# Patient Record
Sex: FEMALE | HISPANIC OR LATINO | Employment: FULL TIME | URBAN - METROPOLITAN AREA
[De-identification: names, ages, dates, MRNs, and addresses within clinical notes are randomized per-mention and may not be internally consistent; named-entity substitution may affect disease eponyms.]

---

## 2017-06-01 ENCOUNTER — HOSPITAL ENCOUNTER (EMERGENCY)
Facility: MEDICAL CENTER | Age: 57
End: 2017-06-01
Payer: COMMERCIAL

## 2017-06-01 VITALS
WEIGHT: 180.34 LBS | RESPIRATION RATE: 16 BRPM | DIASTOLIC BLOOD PRESSURE: 99 MMHG | HEIGHT: 64 IN | HEART RATE: 77 BPM | TEMPERATURE: 98.8 F | BODY MASS INDEX: 30.79 KG/M2 | OXYGEN SATURATION: 95 % | SYSTOLIC BLOOD PRESSURE: 193 MMHG

## 2017-06-01 LAB — EKG IMPRESSION: NORMAL

## 2017-06-01 PROCEDURE — 302449 STATCHG TRIAGE ONLY (STATISTIC)

## 2017-06-01 PROCEDURE — 93005 ELECTROCARDIOGRAM TRACING: CPT

## 2017-06-01 ASSESSMENT — PAIN SCALES - GENERAL: PAINLEVEL_OUTOF10: 0

## 2017-06-01 NOTE — ED NOTES
"Chief Complaint   Patient presents with   • Dizziness     started feeling dizzy after receiving \"numbing medication for dental procedure\"   • Blood Pressure Problem     told high blood pressure after dental procedure     /99 mmHg  Pulse 77  Temp(Src) 37.1 °C (98.8 °F)  Resp 16  Ht 1.626 m (5' 4.02\")  Wt 81.8 kg (180 lb 5.4 oz)  BMI 30.94 kg/m2  SpO2 95%  LMP 03/09/2011    "

## 2017-06-01 NOTE — ED NOTES
Patient reports to triage RN that she will be leaving. Triage RN rechecked BP prior to patient leaving 175/90. Patient encouraged to returned to ED for chest pain, SOB, blurred vision, or any other concerning, pt verbalized understanding.

## 2017-06-01 NOTE — ED NOTES
Per note from family member from dental office pt received Lido w/ Epi and Carbocaine during procedure

## 2019-11-15 ENCOUNTER — OFFICE VISIT (OUTPATIENT)
Dept: URGENT CARE | Facility: PHYSICIAN GROUP | Age: 59
End: 2019-11-15

## 2019-11-15 VITALS
DIASTOLIC BLOOD PRESSURE: 100 MMHG | BODY MASS INDEX: 30.73 KG/M2 | HEIGHT: 64 IN | RESPIRATION RATE: 18 BRPM | WEIGHT: 180 LBS | SYSTOLIC BLOOD PRESSURE: 164 MMHG | TEMPERATURE: 97 F | OXYGEN SATURATION: 96 % | HEART RATE: 97 BPM

## 2019-11-15 DIAGNOSIS — R03.0 ELEVATED BLOOD PRESSURE READING: ICD-10-CM

## 2019-11-15 DIAGNOSIS — R30.0 DYSURIA: ICD-10-CM

## 2019-11-15 LAB
APPEARANCE UR: NORMAL
BILIRUB UR STRIP-MCNC: NORMAL MG/DL
COLOR UR AUTO: NORMAL
GLUCOSE UR STRIP.AUTO-MCNC: NORMAL MG/DL
KETONES UR STRIP.AUTO-MCNC: NORMAL MG/DL
LEUKOCYTE ESTERASE UR QL STRIP.AUTO: NORMAL
NITRITE UR QL STRIP.AUTO: NORMAL
PH UR STRIP.AUTO: 7 [PH] (ref 5–8)
PROT UR QL STRIP: NORMAL MG/DL
RBC UR QL AUTO: NORMAL
SP GR UR STRIP.AUTO: 1.02
UROBILINOGEN UR STRIP-MCNC: 0.2 MG/DL

## 2019-11-15 PROCEDURE — 81002 URINALYSIS NONAUTO W/O SCOPE: CPT | Performed by: FAMILY MEDICINE

## 2019-11-15 PROCEDURE — 99203 OFFICE O/P NEW LOW 30 MIN: CPT | Performed by: FAMILY MEDICINE

## 2019-11-15 RX ORDER — PHENAZOPYRIDINE HYDROCHLORIDE 200 MG/1
200 TABLET, FILM COATED ORAL 3 TIMES DAILY PRN
Qty: 6 TAB | Refills: 0 | Status: SHIPPED | OUTPATIENT
Start: 2019-11-15 | End: 2019-12-27

## 2019-11-15 RX ORDER — NITROFURANTOIN 25; 75 MG/1; MG/1
100 CAPSULE ORAL 2 TIMES DAILY
Qty: 10 CAP | Refills: 0 | Status: SHIPPED | OUTPATIENT
Start: 2019-11-15 | End: 2019-11-20

## 2019-11-15 NOTE — PROGRESS NOTES
"Subjective:      Latisha Maciel is a 59 y.o. female who presents with UTI (pain, x5d)            5d urinary urgency and pelvic pressure.  No pain with urination.  No fever or flank pain.  No hematuria.  Migraine this morning/notes this elevates her BP.  Symptoms are moderate severity and progressively worse.  No OTC medications.  No other aggravating or alleviating factors.      Review of Systems   Constitutional: Negative for malaise/fatigue and weight loss.   Eyes: Negative for discharge and redness.   Gastrointestinal: Negative for nausea and vomiting.   Musculoskeletal: Negative for joint pain and myalgias.   Skin: Negative for itching and rash.     .  Medications, Allergies, and current problem list reviewed today in Epic       Objective:     BP (!) 164/100 (BP Location: Left arm, Patient Position: Sitting, BP Cuff Size: Adult)   Pulse 97   Temp 36.1 °C (97 °F) (Temporal)   Resp 18   Ht 1.626 m (5' 4\")   Wt 81.6 kg (180 lb)   LMP 03/09/2011   SpO2 96%   BMI 30.90 kg/m²      Physical Exam  Constitutional:       General: She is not in acute distress.     Appearance: She is well-developed.   HENT:      Head: Normocephalic and atraumatic.   Eyes:      Extraocular Movements: Extraocular movements intact.      Conjunctiva/sclera: Conjunctivae normal.      Pupils: Pupils are equal, round, and reactive to light.   Cardiovascular:      Rate and Rhythm: Normal rate and regular rhythm.      Heart sounds: Normal heart sounds. No murmur.   Pulmonary:      Effort: Pulmonary effort is normal.      Breath sounds: Normal breath sounds. No wheezing.   Genitourinary:     Comments: Suprapubic tenderness.  No CVAT.  Skin:     General: Skin is warm and dry.      Findings: No rash.   Neurological:      Mental Status: She is alert and oriented to person, place, and time.                 Assessment/Plan:   UA reviewed    1. Elevated blood pressure reading     2. Dysuria  nitrofurantoin monohyd macro (MACROBID) 100 MG Cap    " phenazopyridine (PYRIDIUM) 200 MG Tab    POCT Urinalysis     Differential diagnosis, natural history, supportive care, and indications for immediate follow-up discussed at length.     Patient has already been using antibiotics.  Discussed that urine culture is unlikely to be helpful in this setting.  Recommended to complete 5-day course of nitrofurantoin and follow-up if symptoms persist.    BP log and f/u with pcp.

## 2019-11-28 ASSESSMENT — ENCOUNTER SYMPTOMS
VOMITING: 0
NAUSEA: 0
WEIGHT LOSS: 0
EYE REDNESS: 0
MYALGIAS: 0
EYE DISCHARGE: 0

## 2019-12-27 ENCOUNTER — HOSPITAL ENCOUNTER (EMERGENCY)
Facility: MEDICAL CENTER | Age: 59
End: 2019-12-27
Attending: EMERGENCY MEDICINE

## 2019-12-27 ENCOUNTER — APPOINTMENT (OUTPATIENT)
Dept: RADIOLOGY | Facility: MEDICAL CENTER | Age: 59
End: 2019-12-27
Attending: EMERGENCY MEDICINE

## 2019-12-27 VITALS
HEART RATE: 91 BPM | DIASTOLIC BLOOD PRESSURE: 77 MMHG | RESPIRATION RATE: 20 BRPM | SYSTOLIC BLOOD PRESSURE: 149 MMHG | BODY MASS INDEX: 32.37 KG/M2 | TEMPERATURE: 98.8 F | OXYGEN SATURATION: 94 % | HEIGHT: 64 IN | WEIGHT: 189.6 LBS

## 2019-12-27 DIAGNOSIS — R10.84 GENERALIZED ABDOMINAL PAIN: ICD-10-CM

## 2019-12-27 LAB
ALBUMIN SERPL BCP-MCNC: 4 G/DL (ref 3.2–4.9)
ALBUMIN/GLOB SERPL: 1 G/DL
ALP SERPL-CCNC: 168 U/L (ref 30–99)
ALT SERPL-CCNC: 22 U/L (ref 2–50)
ANION GAP SERPL CALC-SCNC: 14 MMOL/L (ref 0–11.9)
APPEARANCE UR: CLEAR
APTT PPP: 28.9 SEC (ref 24.7–36)
AST SERPL-CCNC: 32 U/L (ref 12–45)
BASOPHILS # BLD AUTO: 0.6 % (ref 0–1.8)
BASOPHILS # BLD: 0.08 K/UL (ref 0–0.12)
BILIRUB SERPL-MCNC: 0.7 MG/DL (ref 0.1–1.5)
BILIRUB UR QL STRIP.AUTO: NEGATIVE
BUN SERPL-MCNC: 10 MG/DL (ref 8–22)
CALCIUM SERPL-MCNC: 8.9 MG/DL (ref 8.4–10.2)
CHLORIDE SERPL-SCNC: 104 MMOL/L (ref 96–112)
CO2 SERPL-SCNC: 21 MMOL/L (ref 20–33)
COLOR UR: YELLOW
CREAT SERPL-MCNC: 0.69 MG/DL (ref 0.5–1.4)
EOSINOPHIL # BLD AUTO: 0.18 K/UL (ref 0–0.51)
EOSINOPHIL NFR BLD: 1.3 % (ref 0–6.9)
ERYTHROCYTE [DISTWIDTH] IN BLOOD BY AUTOMATED COUNT: 36.9 FL (ref 35.9–50)
GLOBULIN SER CALC-MCNC: 4 G/DL (ref 1.9–3.5)
GLUCOSE SERPL-MCNC: 135 MG/DL (ref 65–99)
GLUCOSE UR STRIP.AUTO-MCNC: NEGATIVE MG/DL
HCT VFR BLD AUTO: 45.9 % (ref 37–47)
HGB BLD-MCNC: 16 G/DL (ref 12–16)
IMM GRANULOCYTES # BLD AUTO: 0.07 K/UL (ref 0–0.11)
IMM GRANULOCYTES NFR BLD AUTO: 0.5 % (ref 0–0.9)
INR PPP: 0.94 (ref 0.87–1.13)
KETONES UR STRIP.AUTO-MCNC: NEGATIVE MG/DL
LEUKOCYTE ESTERASE UR QL STRIP.AUTO: NEGATIVE
LYMPHOCYTES # BLD AUTO: 1.39 K/UL (ref 1–4.8)
LYMPHOCYTES NFR BLD: 10.4 % (ref 22–41)
MCH RBC QN AUTO: 30.8 PG (ref 27–33)
MCHC RBC AUTO-ENTMCNC: 34.9 G/DL (ref 33.6–35)
MCV RBC AUTO: 88.3 FL (ref 81.4–97.8)
MICRO URNS: NORMAL
MONOCYTES # BLD AUTO: 0.76 K/UL (ref 0–0.85)
MONOCYTES NFR BLD AUTO: 5.7 % (ref 0–13.4)
NEUTROPHILS # BLD AUTO: 10.87 K/UL (ref 2–7.15)
NEUTROPHILS NFR BLD: 81.5 % (ref 44–72)
NITRITE UR QL STRIP.AUTO: NEGATIVE
NRBC # BLD AUTO: 0 K/UL
NRBC BLD-RTO: 0 /100 WBC
PH UR STRIP.AUTO: 7 [PH] (ref 5–8)
PLATELET # BLD AUTO: 65 K/UL (ref 164–446)
PMV BLD AUTO: 11.9 FL (ref 9–12.9)
POTASSIUM SERPL-SCNC: 3.6 MMOL/L (ref 3.6–5.5)
PROT SERPL-MCNC: 8 G/DL (ref 6–8.2)
PROT UR QL STRIP: NEGATIVE MG/DL
PROTHROMBIN TIME: 12.7 SEC (ref 12–14.6)
RBC # BLD AUTO: 5.2 M/UL (ref 4.2–5.4)
RBC UR QL AUTO: NEGATIVE
SODIUM SERPL-SCNC: 139 MMOL/L (ref 135–145)
SP GR UR STRIP.AUTO: 1.01
WBC # BLD AUTO: 13.4 K/UL (ref 4.8–10.8)

## 2019-12-27 PROCEDURE — 700102 HCHG RX REV CODE 250 W/ 637 OVERRIDE(OP): Performed by: EMERGENCY MEDICINE

## 2019-12-27 PROCEDURE — 700105 HCHG RX REV CODE 258: Performed by: EMERGENCY MEDICINE

## 2019-12-27 PROCEDURE — 85025 COMPLETE CBC W/AUTO DIFF WBC: CPT

## 2019-12-27 PROCEDURE — 85610 PROTHROMBIN TIME: CPT

## 2019-12-27 PROCEDURE — 36415 COLL VENOUS BLD VENIPUNCTURE: CPT

## 2019-12-27 PROCEDURE — 99284 EMERGENCY DEPT VISIT MOD MDM: CPT

## 2019-12-27 PROCEDURE — 94760 N-INVAS EAR/PLS OXIMETRY 1: CPT

## 2019-12-27 PROCEDURE — 85730 THROMBOPLASTIN TIME PARTIAL: CPT

## 2019-12-27 PROCEDURE — A9270 NON-COVERED ITEM OR SERVICE: HCPCS | Performed by: EMERGENCY MEDICINE

## 2019-12-27 PROCEDURE — 74176 CT ABD & PELVIS W/O CONTRAST: CPT

## 2019-12-27 PROCEDURE — 81003 URINALYSIS AUTO W/O SCOPE: CPT

## 2019-12-27 PROCEDURE — 80053 COMPREHEN METABOLIC PANEL: CPT

## 2019-12-27 RX ORDER — CODEINE/BUTALBITAL/ASA/CAFFEIN 30-50-325
1 CAPSULE ORAL EVERY 6 HOURS PRN
Status: SHIPPED | COMMUNITY
End: 2020-11-23

## 2019-12-27 RX ORDER — ONDANSETRON 2 MG/ML
4 INJECTION INTRAMUSCULAR; INTRAVENOUS ONCE
Status: DISCONTINUED | OUTPATIENT
Start: 2019-12-27 | End: 2019-12-27 | Stop reason: HOSPADM

## 2019-12-27 RX ORDER — HYDROMORPHONE HYDROCHLORIDE 1 MG/ML
0.5 INJECTION, SOLUTION INTRAMUSCULAR; INTRAVENOUS; SUBCUTANEOUS ONCE
Status: DISCONTINUED | OUTPATIENT
Start: 2019-12-27 | End: 2019-12-27 | Stop reason: HOSPADM

## 2019-12-27 RX ORDER — CEFDINIR 300 MG/1
300 CAPSULE ORAL 2 TIMES DAILY
Qty: 20 CAP | Refills: 0 | Status: SHIPPED | OUTPATIENT
Start: 2019-12-27 | End: 2019-12-27 | Stop reason: SDUPTHER

## 2019-12-27 RX ORDER — CEFDINIR 300 MG/1
300 CAPSULE ORAL 2 TIMES DAILY
Qty: 20 CAP | Refills: 0 | Status: SHIPPED | OUTPATIENT
Start: 2019-12-27 | End: 2020-11-23

## 2019-12-27 RX ORDER — METRONIDAZOLE 500 MG/1
500 TABLET ORAL 3 TIMES DAILY
Qty: 30 TAB | Refills: 0 | Status: SHIPPED | OUTPATIENT
Start: 2019-12-27 | End: 2020-01-06

## 2019-12-27 RX ORDER — SODIUM CHLORIDE, SODIUM LACTATE, POTASSIUM CHLORIDE, CALCIUM CHLORIDE 600; 310; 30; 20 MG/100ML; MG/100ML; MG/100ML; MG/100ML
1000 INJECTION, SOLUTION INTRAVENOUS ONCE
Status: COMPLETED | OUTPATIENT
Start: 2019-12-27 | End: 2019-12-27

## 2019-12-27 RX ORDER — METRONIDAZOLE 500 MG/1
500 TABLET ORAL ONCE
Status: COMPLETED | OUTPATIENT
Start: 2019-12-27 | End: 2019-12-27

## 2019-12-27 RX ORDER — CEFDINIR 300 MG/1
300 CAPSULE ORAL ONCE
Status: COMPLETED | OUTPATIENT
Start: 2019-12-27 | End: 2019-12-27

## 2019-12-27 RX ADMIN — METRONIDAZOLE 500 MG: 500 TABLET ORAL at 18:06

## 2019-12-27 RX ADMIN — SODIUM CHLORIDE, SODIUM LACTATE, POTASSIUM CHLORIDE, AND CALCIUM CHLORIDE 1000 ML: 600; 310; 30; 20 INJECTION, SOLUTION INTRAVENOUS at 18:15

## 2019-12-27 RX ADMIN — CEFDINIR 300 MG: 300 CAPSULE ORAL at 18:06

## 2019-12-27 ASSESSMENT — PAIN DESCRIPTION - DESCRIPTORS: DESCRIPTORS: ACHING;SHARP

## 2019-12-27 NOTE — ED PROVIDER NOTES
ED Provider Note    ED Provider Note      Primary care provider: Theron Reno M.D.    CHIEF COMPLAINT  Chief Complaint   Patient presents with   • Abdominal Pain       HPI  Chaparrita Maciel is a 59 y.o. female who presents to the Emergency Department with chief complaint of abdominal pain nausea vomiting diarrhea.  Patient's had symptoms for last couple days gotten progressively worse throughout the day today she describes her pain is crampy throughout her entire abdomen at a 9 out of 10.  Multiple episodes of diarrhea no blood in her diarrhea no dark tarry stool.  She has been extremely nauseated without emesis minor chills without measured fever no headache altered mental status cough congestion chest pain or shortness of breath no urinary complaints no abnormal vaginal bleeding or discharge.    REVIEW OF SYSTEMS  10 systems reviewed and otherwise negative, pertinent positives and negatives listed in the history of present illness.    PAST MEDICAL HISTORY   has a past medical history of Hypertension, Migraine, Mitral valve prolapse, Palpitations, Sarcoid (HCC), and Sarcoidosis, lung (HCC).    SURGICAL HISTORY  patient denies any surgical history    SOCIAL HISTORY  Social History     Tobacco Use   • Smoking status: Never Smoker   • Smokeless tobacco: Never Used   Substance Use Topics   • Alcohol use: No   • Drug use: No      Social History     Substance and Sexual Activity   Drug Use No       FAMILY HISTORY  Non-Contributory    CURRENT MEDICATIONS  Home Medications     Reviewed by Kieran Erickson (Pharmacy Tech) on 12/27/19 at 1447  Med List Status: Complete   Medication Last Dose Status   APPLE CIDER VINEGAR PO 12/26/2019 Active   aspirin (ASA) 81 MG Chew Tab chewable tablet 12/26/2019 Active   butalbital-aspirin-caffeine-codeine (FIORINAL/CODEINE #3) -46-30 MG per capsule 12/26/2019 Active                ALLERGIES  Allergies   Allergen Reactions   • Iodine    • Morphine Anaphylaxis and Vomiting  "  • Percocet [Perloxx]    • Sulfa Drugs      Sick   • Tetracycline      Sick     • Vicodin [Hydrocodone-Acetaminophen]        PHYSICAL EXAM  VITAL SIGNS: BP (!) 216/131   Pulse (!) 122   Temp 38 °C (100.4 °F) (Temporal)   Resp 20   Ht 1.626 m (5' 4\")   Wt 86 kg (189 lb 9.5 oz)   LMP 03/09/2011   SpO2 95%   BMI 32.54 kg/m²   Pulse ox interpretation: I interpret this pulse ox as normal.  Constitutional: Alert and oriented x 3, minimal distress  HEENT: Atraumatic normocephalic, pupils are equal round reactive to light extraocular movements are intact. The nares is clear, external ears are normal, mouth shows moist mucous membranes  Neck: Supple, no JVD no tracheal deviation  Cardiovascular: Tachycardic no murmur rub or gallop 2+ pulses peripherally x4  Thorax & Lungs: No respiratory distress, no wheezes rales or rhonchi, No chest tenderness.   GI: Diffusely tender without localization no rebound no guarding positive bowel sounds nondistended  Skin: Warm dry no acute rash or lesion  Musculoskeletal: Moving all extremities with full range and 5 of 5 strength, no acute  deformity  Neurologic: Cranial nerves III through XII are grossly intact, no sensory deficit, no cerebellar dysfunction   Psychiatric: Appropriate affect for situation at this time      DIAGNOSTIC STUDIES / PROCEDURES  LABS      Results for orders placed or performed during the hospital encounter of 12/27/19   URINALYSIS,CULTURE IF INDICATED   Result Value Ref Range    Color Yellow     Character Clear     Specific Gravity 1.010 <1.035    Ph 7.0 5.0 - 8.0    Glucose Negative Negative mg/dL    Ketones Negative Negative mg/dL    Protein Negative Negative mg/dL    Bilirubin Negative Negative    Nitrite Negative Negative    Leukocyte Esterase Negative Negative    Occult Blood Negative Negative    Micro Urine Req see below    CBC WITH DIFFERENTIAL   Result Value Ref Range    WBC 13.4 (H) 4.8 - 10.8 K/uL    RBC 5.20 4.20 - 5.40 M/uL    Hemoglobin 16.0 " 12.0 - 16.0 g/dL    Hematocrit 45.9 37.0 - 47.0 %    MCV 88.3 81.4 - 97.8 fL    MCH 30.8 27.0 - 33.0 pg    MCHC 34.9 33.6 - 35.0 g/dL    RDW 36.9 35.9 - 50.0 fL    Platelet Count 65 (L) 164 - 446 K/uL    MPV 11.9 9.0 - 12.9 fL    Neutrophils-Polys 81.50 (H) 44.00 - 72.00 %    Lymphocytes 10.40 (L) 22.00 - 41.00 %    Monocytes 5.70 0.00 - 13.40 %    Eosinophils 1.30 0.00 - 6.90 %    Basophils 0.60 0.00 - 1.80 %    Immature Granulocytes 0.50 0.00 - 0.90 %    Nucleated RBC 0.00 /100 WBC    Neutrophils (Absolute) 10.87 (H) 2.00 - 7.15 K/uL    Lymphs (Absolute) 1.39 1.00 - 4.80 K/uL    Monos (Absolute) 0.76 0.00 - 0.85 K/uL    Eos (Absolute) 0.18 0.00 - 0.51 K/uL    Baso (Absolute) 0.08 0.00 - 0.12 K/uL    Immature Granulocytes (abs) 0.07 0.00 - 0.11 K/uL    NRBC (Absolute) 0.00 K/uL   Comp Metabolic Panel   Result Value Ref Range    Sodium 139 135 - 145 mmol/L    Potassium 3.6 3.6 - 5.5 mmol/L    Chloride 104 96 - 112 mmol/L    Co2 21 20 - 33 mmol/L    Anion Gap 14.0 (H) 0.0 - 11.9    Glucose 135 (H) 65 - 99 mg/dL    Bun 10 8 - 22 mg/dL    Creatinine 0.69 0.50 - 1.40 mg/dL    Calcium 8.9 8.4 - 10.2 mg/dL    AST(SGOT) 32 12 - 45 U/L    ALT(SGPT) 22 2 - 50 U/L    Alkaline Phosphatase 168 (H) 30 - 99 U/L    Total Bilirubin 0.7 0.1 - 1.5 mg/dL    Albumin 4.0 3.2 - 4.9 g/dL    Total Protein 8.0 6.0 - 8.2 g/dL    Globulin 4.0 (H) 1.9 - 3.5 g/dL    A-G Ratio 1.0 g/dL   APTT   Result Value Ref Range    APTT 28.9 24.7 - 36.0 sec   PROTHROMBIN TIME (INR)   Result Value Ref Range    PT 12.7 12.0 - 14.6 sec    INR 0.94 0.87 - 1.13   ESTIMATED GFR   Result Value Ref Range    GFR If African American >60 >60 mL/min/1.73 m 2    GFR If Non African American >60 >60 mL/min/1.73 m 2       All labs reviewed by me.      RADIOLOGY  CT-ABDOMEN-PELVIS W/O   Final Result      1.  Extensive diverticulosis. Inflamed diverticulum in the proximal sigmoid colon with adjacent stranding in the pericolonic fat is consistent with acute diverticulitis. No  "abscess or pneumoperitoneum is identified.      2.  Hepatic steatosis.        The radiologist's interpretation of all radiological studies have been reviewed by me.    COURSE & MEDICAL DECISION MAKING  Pertinent Labs & Imaging studies reviewed. (See chart for details)    3:06 PM - Patient seen and examined at bedside.         Patient noted to have slightly elevated blood pressure likely circumstantial secondary to presenting complaint. Referred to primary care physician for further evaluation.     Patient was given IV fluids based on tachycardia dry mucous membranes concern for dehydration, oral hydration was not attempted due to insufficiency for hydration, after fluids had resolution of tachycardia and improvement of symptoms    Medical Decision Making: CT scan shows extensive diverticulosis throughout the colon some inflamed diverticulum in the proximal sigmoid with some adjacent stranding consistent with acute diverticulitis no abscess or perforation patient's tachycardia is complete resolved minimal leukocytosis she was given dose of Omnicef and metronidazole here she will be prescribed the same.  We discussed outpatient variant versus inpatient management patient prefers to try outpatient which I think is appropriate.  Given prescription for above instructed return for worsening pain any blood in stool blood in emesis any other acute symptoms or concerns otherwise follow-up with primary care.    /77   Pulse 91   Temp 37.1 °C (98.8 °F) (Temporal)   Resp 20   Ht 1.626 m (5' 4\")   Wt 86 kg (189 lb 9.5 oz)   LMP 03/09/2011   SpO2 94%   BMI 32.54 kg/m²     Theron Reno M.D.  [address] [facility] [place] [place] [address]  [phone]    In 2 days      [facility], Emergency Dept  [address]  [facility] [id number] [phone]    in 12-24 hours if symptoms persist,, immediately if symptoms worsen          FINAL IMPRESSION  1. Generalized abdominal pain          This " dictation has been created using voice recognition software and/or scribes. The accuracy of the dictation is limited by the abilities of the software and the expertise of the scribes. I expect there may be some errors of grammar and possibly content. I made every attempt to manually correct the errors within my dictation. However, errors related to voice recognition software and/or scribes may still exist and should be interpreted within the appropriate context.

## 2019-12-27 NOTE — ED NOTES
Pt complains of RLQ pain with no fevers. Pt states she has a HA. md aware of HA and HTN. Pt is able to talk in full coherent sentences

## 2019-12-27 NOTE — ED TRIAGE NOTES
Pt c/o sudden onset abd pain yesterday. Pt notes pain is around belly button area, with radiation into RLQ. Pt c/o n/v/d. Pt denies injury. Pt denies urinary symptoms.     UA collected in triage. Sent to lab for processing.

## 2019-12-28 NOTE — ED NOTES
Assisted w/ pt care.  Pt declined Dilaudid and Zofran at this time.  Pt and family member aware waiting for test results and chart review by ERP.

## 2020-11-05 ENCOUNTER — TELEPHONE (OUTPATIENT)
Dept: SCHEDULING | Facility: IMAGING CENTER | Age: 60
End: 2020-11-05

## 2020-11-23 ENCOUNTER — TELEMEDICINE (OUTPATIENT)
Dept: MEDICAL GROUP | Age: 60
End: 2020-11-23
Payer: COMMERCIAL

## 2020-11-23 VITALS — HEIGHT: 64 IN | WEIGHT: 185 LBS | TEMPERATURE: 97.5 F | BODY MASS INDEX: 31.58 KG/M2

## 2020-11-23 DIAGNOSIS — J45.20 INTERMITTENT ASTHMA WITHOUT COMPLICATION, UNSPECIFIED ASTHMA SEVERITY: ICD-10-CM

## 2020-11-23 DIAGNOSIS — E55.9 HYPOVITAMINOSIS D: ICD-10-CM

## 2020-11-23 DIAGNOSIS — I10 ESSENTIAL HYPERTENSION: ICD-10-CM

## 2020-11-23 DIAGNOSIS — Z00.00 HEALTH CARE MAINTENANCE: ICD-10-CM

## 2020-11-23 DIAGNOSIS — R53.83 FATIGUE, UNSPECIFIED TYPE: ICD-10-CM

## 2020-11-23 DIAGNOSIS — E78.5 DYSLIPIDEMIA: ICD-10-CM

## 2020-11-23 DIAGNOSIS — D69.6 THROMBOCYTOPENIA (HCC): ICD-10-CM

## 2020-11-23 DIAGNOSIS — Z12.11 SCREENING FOR MALIGNANT NEOPLASM OF COLON: ICD-10-CM

## 2020-11-23 DIAGNOSIS — Z00.00 ANNUAL PHYSICAL EXAM: ICD-10-CM

## 2020-11-23 DIAGNOSIS — D86.0 SARCOIDOSIS OF LUNG (HCC): ICD-10-CM

## 2020-11-23 DIAGNOSIS — Z12.31 ENCOUNTER FOR SCREENING MAMMOGRAM FOR BREAST CANCER: ICD-10-CM

## 2020-11-23 DIAGNOSIS — Z11.59 NEED FOR HEPATITIS C SCREENING TEST: ICD-10-CM

## 2020-11-23 PROCEDURE — 99214 OFFICE O/P EST MOD 30 MIN: CPT | Mod: 25 | Performed by: INTERNAL MEDICINE

## 2020-11-23 PROCEDURE — 99396 PREV VISIT EST AGE 40-64: CPT | Performed by: INTERNAL MEDICINE

## 2020-11-23 RX ORDER — LOSARTAN POTASSIUM 25 MG/1
25 TABLET ORAL DAILY
Qty: 30 TAB | Refills: 0 | Status: SHIPPED | OUTPATIENT
Start: 2020-11-23 | End: 2021-04-08

## 2020-11-23 RX ORDER — BUTALBITAL, ACETAMINOPHEN, CAFFEINE AND CODEINE PHOSPHATE 50; 325; 40; 30 MG/1; MG/1; MG/1; MG/1
CAPSULE ORAL
COMMUNITY
Start: 2020-09-18

## 2020-11-23 ASSESSMENT — PAIN SCALES - GENERAL: PAINLEVEL: NO PAIN

## 2020-11-23 ASSESSMENT — FIBROSIS 4 INDEX: FIB4 SCORE: 6.4

## 2020-11-23 NOTE — PROGRESS NOTES
Telemedicine Visit: Established Patient     This Remote Face to Face encounter was conducted via Zoom. Given the importance of social distancing and other strategies recommended to reduce the risk of COVID-19 transmission, I am providing medical care to this patient via audio/video visit in place of an in person visit at the request of the patient. Verbal consent to telehealth, risks, benefits, and consequences were discussed. Patient retains the right to withdraw at any time. All existing confidentiality protections apply. The patient has access to all transmitted medical information. No dissemination of any patient images or information to other entities without further written consent.    CHIEF COMPLAINT     Chief Complaint   Patient presents with   • New Patient   Hypertension    HPI  Chaparrita Maciel is a 60 y.o. female who presents today for the following     HCM  Recommendations:  Regular exercise at least 4 days a week  Diet: advised balanced diet  Dental exam at least 1-2 times per year  Sunscreen use: advised     Immunizations:  TdaP:  advised  Shingles: advised  Influenza: advised     Colonoscopy: ordered     GYN  Previous PAP:   Due, normal   Abnormal PAP: no  Last Mammography: ordered     Hypertension  Meds: Losartan 25 mg daily, taking as prescribed.   Denies:  -  headaches, vision problems, tinnitus.                 -  chest pain/pressure, palpitations, irregular heart beats, exertional, dyspnea, peripheral edema.      - medication side effect: unusual fatigue, slow heartbeat, foot/leg swelling, cough.  Low salt diet: advised  Diet: advised cardiac  Exercise: advised daily  BMI: Body mass index is 31.76 kg/m².  FH of HTN: brother, father    Dyslipidemia, hepatic steatosis  The patient has slightly abnormal lipid panel, no medications.  Diet /Exercise/BMI:  As above  FH: brother    CT abdomen, 12/27/2019  Hepatic steatosis     Hypovitaminosis D, fatigue  The patient had low vitamin D level.  C/o  fatigue.  Vitamin D supplement: OTC.    Thrombocytopenia  CBC showed thrombocytopenia.  Patient has not have easy bleeding or bruising.    MIigraine  Onset: 18 y/o  The patient reports  frontal headaches, described as: moderate in severity , squeezing with nausea, photophobia and sonophobia, without radiation.  Usual frequency is twice a week.  Aura is present.  Headaches are relieved by fioricet  Known triggers include:  weather changes.   Current stressors include: stress.  Previous treatment and prevention include: multiple    Pertinent negatives:  Denies difficulty with speech or swallowing, facial numbness or tingling, focal weakness. Denies sore throat or cough, fever, sinus congestion, ear pain, tooth clenching or grinding.    Family Hx: migraine headaches in child  Prior imaging: yes    Sarcoidosis, lungs / asthma  60-year-old, female, history of controlled asthma, without recent albuterol use    Diagnosed with sarcoidosis in 2003   - Found to have lung nodules   - had bx  Was f/u by rheumatology, the last appointment > 2 yrs ago.  No available records.    Denies:  -Fever, chills, cough  -Anorexia, weight loss    CXR, 3/8/2016  1.  No acute cardiopulmonary disease.  2.  Cardiomegaly    Reviewed PMH, PSH, FH, SH, ALL, HCM/IMM, updated  Reviewed MEDS, updated    CURRENT MEDICATIONS  Current Outpatient Medications   Medication Sig Dispense Refill   • butalbital-acetaminophen-caffeine-codeine (FIORICET W/CODEINE) -81-30 MG per capsule      • multivitamin (THERAGRAN) Tab Take 1 Tab by mouth every day.     • VITAMIN D PO Take 1,000 mg by mouth 4 times a day.     • aspirin (ASA) 81 MG Chew Tab chewable tablet Take 81 mg by mouth every day.       No current facility-administered medications for this visit.      ALLERGIES  Allergies: Epinephrine, Iodine, Morphine, Percocet [perloxx], Shellfish allergy, Sulfa drugs, Tetracycline, and Vicodin [hydrocodone-acetaminophen]  PAST MEDICAL HISTORY  Past Medical  "History:   Diagnosis Date   • Hypertension    • Migraine    • Mitral valve prolapse    • Palpitations    • Sarcoid    • Sarcoidosis, lung (HCC)      SURGICAL HISTORY  She  has no past surgical history on file.  SOCIAL HISTORY  Social History     Tobacco Use   • Smoking status: Never Smoker   • Smokeless tobacco: Never Used   Substance Use Topics   • Alcohol use: No   • Drug use: No     Social History     Social History Narrative   • Not on file     FAMILY HISTORY  No family history on file.  No family status information on file.     ROS   Constitutional: Negative for fever, chills, fatigue.  HENT: Negative for congestion, sore throat.  Eyes: Negative for vision problems.   Respiratory: Negative for cough, shortness of breath.  Cardiovascular: Negative for chest pain, palpitations.   Gastrointestinal: Negative for heartburn, nausea, abdominal pain.   Genitourinary: Negative for dysuria.  Musculoskeletal: Negative for significant myalgia, back and joint pain.   Skin: Negative for rash.   Neuro: Negative for dizziness, weakness and headaches.   Endo/Heme/Allergies: Does not bruise/bleed easily.   Psychiatric/Behavioral: Negative for depression.    Objective   Vitals obtained by patient:  Temperature 36.4 °C (97.5 °F) (Oral)   Height 1.626 m (5' 4\")   Weight 83.9 kg (185 lb)   Last Menstrual Period 03/09/2011   Body Mass Index 31.76 kg/m²   Physical Exam:  Constitutional: Alert, no distress, well-groomed.  Skin: No rash in visible areas.  Eye: Round. Conjunctiva clear, lids normal.  ENMT: Lips pink without lesions, good dentition. Phonation normal.  Neck: No visible masses or thyromegaly. Moves freely without pain.  CV: no peripheral cyanosis, tachycardia.  Respiratory: Unlabored respiratory effort, no cough or audible wheezing.  Psych: Alert and oriented x3, normal affect and mood.     Labs     Labs are reviewed and discussed with a patient  No results found for: CHOLSTRLTOT, LDL, HDL, TRIGLYCERIDE    Lab Results "   Component Value Date/Time    SODIUM 139 12/27/2019 03:28 PM    POTASSIUM 3.6 12/27/2019 03:28 PM    CHLORIDE 104 12/27/2019 03:28 PM    CO2 21 12/27/2019 03:28 PM    GLUCOSE 135 (H) 12/27/2019 03:28 PM    BUN 10 12/27/2019 03:28 PM    CREATININE 0.69 12/27/2019 03:28 PM     Lab Results   Component Value Date/Time    ALKPHOSPHAT 168 (H) 12/27/2019 03:28 PM    ASTSGOT 32 12/27/2019 03:28 PM    ALTSGPT 22 12/27/2019 03:28 PM    TBILIRUBIN 0.7 12/27/2019 03:28 PM      Lab Results   Component Value Date/Time    WBC 13.4 (H) 12/27/2019 03:28 PM    RBC 5.20 12/27/2019 03:28 PM    HEMOGLOBIN 16.0 12/27/2019 03:28 PM    HEMATOCRIT 45.9 12/27/2019 03:28 PM    MCV 88.3 12/27/2019 03:28 PM    MCH 30.8 12/27/2019 03:28 PM    MCHC 34.9 12/27/2019 03:28 PM    MPV 11.9 12/27/2019 03:28 PM    NEUTSPOLYS 81.50 (H) 12/27/2019 03:28 PM    LYMPHOCYTES 10.40 (L) 12/27/2019 03:28 PM    MONOCYTES 5.70 12/27/2019 03:28 PM    EOSINOPHILS 1.30 12/27/2019 03:28 PM    BASOPHILS 0.60 12/27/2019 03:28 PM      Imaging      Reviewed and discussed per HPI    Assessment and Plan     Chaparrita Maciel is a 60 y.o. female    1. Annual physical exam  Reviewed PMH, PSH, FH, SH, ALL, MEDS, HCM/IMM.   Advised healthy habits, diet, exercise.    2. Health care maintenance  Per HPI    3. Encounter for screening mammogram for breast cancer  - MA-SCREENING MAMMO BILAT W/CAD; Future    4.  Screening malignancy:  - REFERRAL TO GI FOR COLONOSCOPY    5. Need for hepatitis C screening test  - HEP C VIRUS ANTIBODY; Future    6. Essential hypertension  Controlled, continue with current treatment.  - Comp Metabolic Panel; Future    7. Dyslipidemia  Borderline, advised low-calorie diet, daily exercise, weight control, pending labs  - Comp Metabolic Panel; Future  - Lipid Profile; Future    8. Hypovitaminosis D  Continue current supplement, pending labs  - VITAMIN D,25 HYDROXY; Future    9. Fatigue, unspecified type  Pending labs  - TSH WITH REFLEX TO FT4; Future    10.  Thrombocytopenia (HCC)  Borderline, follow-up labs  - CBC WITH DIFFERENTIAL; Future    11. Chronic migraine  Controlled, continue with current treatment.    12. Sarcoidosis of lung (HCC)  No recent follow-up, pending imaging and labs  - DX-CHEST-2 VIEWS; Future  - ANGIOTENSIN I CONVERTING ENZYME; Future  - Sed Rate; Future  - REFERRAL TO RHEUMATOLOGY    13. Intermittent asthma without complication, unspecified asthma severity  Controlled, continue albuterol as needed    Follow-up: Within 1 month, labs, imaging, Pap smear

## 2020-12-17 DIAGNOSIS — D72.829 LEUKOCYTOSIS, UNSPECIFIED TYPE: ICD-10-CM

## 2020-12-17 LAB
25(OH)D3+25(OH)D2 SERPL-MCNC: 44 NG/ML (ref 30–100)
ACE SERPL-CCNC: 70 U/L (ref 14–82)
ALBUMIN SERPL-MCNC: 4.3 G/DL (ref 3.8–4.8)
ALBUMIN/GLOB SERPL: 1.3 {RATIO} (ref 1.2–2.2)
ALP SERPL-CCNC: 188 IU/L (ref 39–117)
ALT SERPL-CCNC: 45 IU/L (ref 0–32)
AST SERPL-CCNC: 47 IU/L (ref 0–40)
BASOPHILS # BLD AUTO: NORMAL 10*3/UL
BASOPHILS NFR BLD AUTO: NORMAL %
BILIRUB SERPL-MCNC: 0.6 MG/DL (ref 0–1.2)
BUN SERPL-MCNC: 10 MG/DL (ref 8–27)
BUN/CREAT SERPL: 11 (ref 12–28)
CALCIUM SERPL-MCNC: 9.9 MG/DL (ref 8.7–10.3)
CHLORIDE SERPL-SCNC: 102 MMOL/L (ref 96–106)
CHOLEST SERPL-MCNC: 193 MG/DL (ref 100–199)
CO2 SERPL-SCNC: 22 MMOL/L (ref 20–29)
CREAT SERPL-MCNC: 0.87 MG/DL (ref 0.57–1)
EOSINOPHIL # BLD AUTO: NORMAL 10*3/UL
EOSINOPHIL NFR BLD AUTO: NORMAL %
ERYTHROCYTE [DISTWIDTH] IN BLOOD BY AUTOMATED COUNT: NORMAL %
ERYTHROCYTE [SEDIMENTATION RATE] IN BLOOD BY WESTERGREN METHOD: NORMAL MM/HR
GLOBULIN SER CALC-MCNC: 3.4 G/DL (ref 1.5–4.5)
GLUCOSE SERPL-MCNC: 137 MG/DL (ref 65–99)
HCT VFR BLD AUTO: NORMAL %
HCV AB S/CO SERPL IA: NORMAL S/CO RATIO
HDLC SERPL-MCNC: 52 MG/DL
HGB BLD-MCNC: NORMAL G/DL
IMM GRANULOCYTES # BLD AUTO: NORMAL 10*3/UL
IMM GRANULOCYTES NFR BLD AUTO: NORMAL %
IMMATURE CELLS  115398: NORMAL
LABORATORY COMMENT REPORT: ABNORMAL
LDLC SERPL CALC-MCNC: 114 MG/DL (ref 0–99)
LYMPHOCYTES # BLD AUTO: NORMAL 10*3/UL
LYMPHOCYTES NFR BLD AUTO: NORMAL %
MCH RBC QN AUTO: NORMAL PG
MCHC RBC AUTO-ENTMCNC: NORMAL G/DL
MCV RBC AUTO: NORMAL FL
MONOCYTES # BLD AUTO: NORMAL 10*3/UL
MONOCYTES NFR BLD AUTO: NORMAL %
MORPHOLOGY BLD-IMP: NORMAL
NEUTROPHILS # BLD AUTO: NORMAL 10*3/UL
NEUTROPHILS NFR BLD AUTO: NORMAL %
NRBC BLD AUTO-RTO: NORMAL %
PLATELET # BLD AUTO: NORMAL 10*3/UL
POTASSIUM SERPL-SCNC: 3.9 MMOL/L (ref 3.5–5.2)
PROT SERPL-MCNC: 7.7 G/DL (ref 6–8.5)
RBC # BLD AUTO: NORMAL 10*6/UL
REQUEST PROBLEM   100552: NORMAL
REQUEST PROBLEM   100875: NORMAL
SODIUM SERPL-SCNC: 139 MMOL/L (ref 134–144)
TRIGL SERPL-MCNC: 152 MG/DL (ref 0–149)
TSH SERPL DL<=0.005 MIU/L-ACNC: 0.92 UIU/ML (ref 0.45–4.5)
VLDLC SERPL CALC-MCNC: 27 MG/DL (ref 5–40)
WBC # BLD AUTO: NORMAL X10E3/UL

## 2020-12-22 LAB
25(OH)D3+25(OH)D2 SERPL-MCNC: 43.3 NG/ML (ref 30–100)
ACE SERPL-CCNC: 69 U/L (ref 14–82)
ALBUMIN SERPL-MCNC: 4.2 G/DL (ref 3.8–4.8)
ALBUMIN/GLOB SERPL: 1.3 {RATIO} (ref 1.2–2.2)
ALP SERPL-CCNC: 173 IU/L (ref 39–117)
ALT SERPL-CCNC: 38 IU/L (ref 0–32)
AST SERPL-CCNC: 40 IU/L (ref 0–40)
BASOPHILS # BLD AUTO: 0.1 X10E3/UL (ref 0–0.2)
BASOPHILS NFR BLD AUTO: 1 %
BILIRUB SERPL-MCNC: 0.7 MG/DL (ref 0–1.2)
BUN SERPL-MCNC: 12 MG/DL (ref 8–27)
BUN/CREAT SERPL: 13 (ref 12–28)
CALCIUM SERPL-MCNC: 9.6 MG/DL (ref 8.7–10.3)
CHLORIDE SERPL-SCNC: 100 MMOL/L (ref 96–106)
CHOLEST SERPL-MCNC: 174 MG/DL (ref 100–199)
CO2 SERPL-SCNC: 24 MMOL/L (ref 20–29)
CREAT SERPL-MCNC: 0.91 MG/DL (ref 0.57–1)
EOSINOPHIL # BLD AUTO: 0.5 X10E3/UL (ref 0–0.4)
EOSINOPHIL NFR BLD AUTO: 7 %
ERYTHROCYTE [DISTWIDTH] IN BLOOD BY AUTOMATED COUNT: 11.4 % (ref 11.7–15.4)
ERYTHROCYTE [SEDIMENTATION RATE] IN BLOOD BY WESTERGREN METHOD: 35 MM/HR (ref 0–40)
GLOBULIN SER CALC-MCNC: 3.3 G/DL (ref 1.5–4.5)
GLUCOSE SERPL-MCNC: 155 MG/DL (ref 65–99)
HCT VFR BLD AUTO: 46.4 % (ref 34–46.6)
HCV AB S/CO SERPL IA: <0.1 S/CO RATIO (ref 0–0.9)
HDLC SERPL-MCNC: 50 MG/DL
HGB BLD-MCNC: 15.8 G/DL (ref 11.1–15.9)
IMM GRANULOCYTES # BLD AUTO: ABNORMAL 10*3/UL
IMM GRANULOCYTES NFR BLD AUTO: ABNORMAL %
IMMATURE CELLS  115398: ABNORMAL
LABORATORY COMMENT REPORT: NORMAL
LDLC SERPL CALC-MCNC: 99 MG/DL (ref 0–99)
LYMPHOCYTES # BLD AUTO: 2.4 X10E3/UL (ref 0.7–3.1)
LYMPHOCYTES NFR BLD AUTO: 34 %
MCH RBC QN AUTO: 31.5 PG (ref 26.6–33)
MCHC RBC AUTO-ENTMCNC: 34.1 G/DL (ref 31.5–35.7)
MCV RBC AUTO: 93 FL (ref 79–97)
MONOCYTES # BLD AUTO: 0.6 X10E3/UL (ref 0.1–0.9)
MONOCYTES NFR BLD AUTO: 8 %
MORPHOLOGY BLD-IMP: ABNORMAL
NEUTROPHILS # BLD AUTO: 3.6 X10E3/UL (ref 1.4–7)
NEUTROPHILS NFR BLD AUTO: 50 %
NRBC BLD AUTO-RTO: ABNORMAL %
PLATELET # BLD AUTO: 68 X10E3/UL (ref 150–450)
POTASSIUM SERPL-SCNC: 3.9 MMOL/L (ref 3.5–5.2)
PROT SERPL-MCNC: 7.5 G/DL (ref 6–8.5)
RBC # BLD AUTO: 5.01 X10E6/UL (ref 3.77–5.28)
SODIUM SERPL-SCNC: 137 MMOL/L (ref 134–144)
TRIGL SERPL-MCNC: 142 MG/DL (ref 0–149)
TSH SERPL DL<=0.005 MIU/L-ACNC: 1.05 UIU/ML (ref 0.45–4.5)
VLDLC SERPL CALC-MCNC: 25 MG/DL (ref 5–40)
WBC # BLD AUTO: 7.2 X10E3/UL (ref 3.4–10.8)

## 2021-01-18 ENCOUNTER — TELEMEDICINE (OUTPATIENT)
Dept: MEDICAL GROUP | Age: 61
End: 2021-01-18
Payer: COMMERCIAL

## 2021-01-18 VITALS — BODY MASS INDEX: 31.58 KG/M2 | WEIGHT: 185 LBS | HEIGHT: 64 IN | TEMPERATURE: 97.3 F

## 2021-01-18 DIAGNOSIS — D69.6 THROMBOCYTOPENIA (HCC): ICD-10-CM

## 2021-01-18 DIAGNOSIS — R74.8 ELEVATED ALKALINE PHOSPHATASE LEVEL: ICD-10-CM

## 2021-01-18 DIAGNOSIS — E55.9 HYPOVITAMINOSIS D: ICD-10-CM

## 2021-01-18 DIAGNOSIS — E78.5 DYSLIPIDEMIA: ICD-10-CM

## 2021-01-18 DIAGNOSIS — E66.9 OBESITY (BMI 30.0-34.9): ICD-10-CM

## 2021-01-18 DIAGNOSIS — R73.01 IFG (IMPAIRED FASTING GLUCOSE): ICD-10-CM

## 2021-01-18 DIAGNOSIS — K76.0 HEPATIC STEATOSIS: ICD-10-CM

## 2021-01-18 DIAGNOSIS — R74.01 TRANSAMINITIS: ICD-10-CM

## 2021-01-18 PROCEDURE — 99214 OFFICE O/P EST MOD 30 MIN: CPT | Mod: 95,CR | Performed by: INTERNAL MEDICINE

## 2021-01-18 ASSESSMENT — ANXIETY QUESTIONNAIRES
3. WORRYING TOO MUCH ABOUT DIFFERENT THINGS: SEVERAL DAYS
7. FEELING AFRAID AS IF SOMETHING AWFUL MIGHT HAPPEN: NOT AT ALL
6. BECOMING EASILY ANNOYED OR IRRITABLE: NOT AT ALL
1. FEELING NERVOUS, ANXIOUS, OR ON EDGE: MORE THAN HALF THE DAYS
5. BEING SO RESTLESS THAT IT IS HARD TO SIT STILL: NOT AT ALL
GAD7 TOTAL SCORE: 5
4. TROUBLE RELAXING: SEVERAL DAYS
2. NOT BEING ABLE TO STOP OR CONTROL WORRYING: SEVERAL DAYS

## 2021-01-18 ASSESSMENT — PATIENT HEALTH QUESTIONNAIRE - PHQ9
5. POOR APPETITE OR OVEREATING: 1 - SEVERAL DAYS
SUM OF ALL RESPONSES TO PHQ QUESTIONS 1-9: 4
CLINICAL INTERPRETATION OF PHQ2 SCORE: 2

## 2021-01-18 ASSESSMENT — FIBROSIS 4 INDEX: FIB4 SCORE: 5.82

## 2021-01-18 NOTE — PROGRESS NOTES
Telemedicine Visit: Established Patient     This Remote Face to Face encounter was conducted via Zoom. Given the importance of social distancing and other strategies recommended to reduce the risk of COVID-19 transmission, I am providing medical care to this patient via audio/video visit in place of an in person visit at the request of the patient. Verbal consent to telehealth, risks, benefits, and consequences were discussed. Patient retains the right to withdraw at any time. All existing confidentiality protections apply. The patient has access to all transmitted medical information. No dissemination of any patient images or information to other entities without further written consent.    CHIEF COMPLAINT     Chief Complaint   Patient presents with   • Lab Results     and imaging results   • Other     rescheduled her colonscopy and MRI     HPI  Chaparrita Maciel is a 60 y.o. female who presents today for the following     Dyslipidemia, hepatic steatosis (transaminitis), elevated AP)  The patient has slightly abnormal lipid panel, no medications.  Diet: advised low breanna  Exercise: advised QD  BMI 31  FH: brother  Labs showed mild transaminitis and elevated alk phos.     CT abdomen, 12/27/2019  Hepatic steatosis    IFG  The patient had elevated FBG.  No polydipsia, polyphagia, polyuria.  No abdominal pain, weight loss, fatigue.  Diet/exercise/BMI: As above  FH of DM: brothers x 2     Hypovitaminosis D  The patient had low vitamin D level.  Vitamin D supplement: OTC.     Thrombocytopenia  CBC showed thrombocytopenia.  Patient has not have easy bleeding or bruising.     Obesity, Body mass index is 31.76 kg/m².  Onset: since high school  No temperature intolerance. No change in hair/skin quality, BMs.   No HTN, buffalo hump, purple striae, flushing.  FH of obesity: multiple    Reviewed PMH, PSH, FH, SH, ALL, HCM/IMM, no changes  Reviewed MEDS, no changes    Patient Active Problem List    Diagnosis Date Noted   •  Intermittent asthma without complication 11/23/2020   • Sarcoidosis of lung (HCC) 11/23/2020     CURRENT MEDICATIONS  Current Outpatient Medications   Medication Sig Dispense Refill   • butalbital-acetaminophen-caffeine-codeine (FIORICET W/CODEINE) -46-30 MG per capsule      • multivitamin (THERAGRAN) Tab Take 1 Tab by mouth every day.     • VITAMIN D PO Take 1,000 mg by mouth 4 times a day.     • losartan (COZAAR) 25 MG Tab Take 1 Tab by mouth every day. 30 Tab 0   • aspirin (ASA) 81 MG Chew Tab chewable tablet Take 81 mg by mouth every day.       No current facility-administered medications for this visit.      ALLERGIES  Allergies: Epinephrine, Iodine, Morphine, Percocet [perloxx], Shellfish allergy, Sulfa drugs, Tetracycline, and Vicodin [hydrocodone-acetaminophen]  PAST MEDICAL HISTORY  Past Medical History:   Diagnosis Date   • Hypertension    • Migraine    • Mitral valve prolapse    • Palpitations    • Sarcoid    • Sarcoidosis, lung (HCC)      SURGICAL HISTORY  She  has no past surgical history on file.  SOCIAL HISTORY  Social History     Tobacco Use   • Smoking status: Never Smoker   • Smokeless tobacco: Never Used   Substance Use Topics   • Alcohol use: No   • Drug use: No     Social History     Social History Narrative   • Not on file     FAMILY HISTORY  Family History   Problem Relation Age of Onset   • Hypertension Father    • Hypertension Brother    • Diabetes Brother    • Hyperlipidemia Neg Hx      Family Status   Relation Name Status   • Fa  (Not Specified)   • Bro  (Not Specified)   • Neg Hx  (Not Specified)       ROS   Constitutional: Negative for fever, chills, fatigue.  HENT: Negative for congestion, sore throat.  Eyes: Negative for vision problems.   Respiratory: Negative for cough, shortness of breath.  Cardiovascular: Negative for chest pain, palpitations.   Gastrointestinal: Negative for heartburn, nausea, abdominal pain.   Genitourinary: Negative for dysuria.  Musculoskeletal: Negative  "for significant myalgia, back and joint pain.   Skin: Negative for rash.   Neuro: Negative for dizziness, weakness and headaches.   Endo/Heme/Allergies: Does not bruise/bleed easily.   Psychiatric/Behavioral: Negative for depression.    Objective   Vitals obtained by patient:  Temperature 36.3 °C (97.3 °F) (Oral)   Height 1.626 m (5' 4\")   Weight 83.9 kg (185 lb)   Last Menstrual Period 03/09/2011   Body Mass Index 31.76 kg/m²   Physical Exam:  Constitutional: Alert, no distress, well-groomed.  Skin: No rash in visible areas.  Eye: Round. Conjunctiva clear, lids normal.  ENMT: Lips pink without lesions, good dentition. Phonation normal.  Neck: No visible masses or thyromegaly. Moves freely without pain.  CV: no peripheral cyanosis, tachycardia.  Respiratory: Unlabored respiratory effort, no cough or audible wheezing.  Psych: Alert and oriented x3, normal affect and mood.     Labs     Labs are reviewed and discussed with a patient  Lab Results   Component Value Date/Time    CHOLSTRLTOT 174 12/18/2020 10:02 AM    HDL 50 12/18/2020 10:02 AM    TRIGLYCERIDE 142 12/18/2020 10:02 AM       Lab Results   Component Value Date/Time    SODIUM 137 12/18/2020 10:02 AM    SODIUM 139 12/27/2019 03:28 PM    POTASSIUM 3.9 12/18/2020 10:02 AM    POTASSIUM 3.6 12/27/2019 03:28 PM    CHLORIDE 100 12/18/2020 10:02 AM    CHLORIDE 104 12/27/2019 03:28 PM    CO2 24 12/18/2020 10:02 AM    CO2 21 12/27/2019 03:28 PM    GLUCOSE 155 (H) 12/18/2020 10:02 AM    GLUCOSE 135 (H) 12/27/2019 03:28 PM    BUN 12 12/18/2020 10:02 AM    BUN 10 12/27/2019 03:28 PM    CREATININE 0.91 12/18/2020 10:02 AM    CREATININE 0.69 12/27/2019 03:28 PM    BUNCREATRAT 13 12/18/2020 10:02 AM     Lab Results   Component Value Date/Time    ALKPHOSPHAT 173 (H) 12/18/2020 10:02 AM    ALKPHOSPHAT 168 (H) 12/27/2019 03:28 PM    ASTSGOT 40 12/18/2020 10:02 AM    ASTSGOT 32 12/27/2019 03:28 PM    ALTSGPT 38 (H) 12/18/2020 10:02 AM    ALTSGPT 22 12/27/2019 03:28 PM    " TBILIRUBIN 0.7 12/18/2020 10:02 AM    TBILIRUBIN 0.7 12/27/2019 03:28 PM      No results found for: HBA1C  Lab Results   Component Value Date/Time    TSH 1.050 12/18/2020 10:02 AM    TSH 0.924 12/14/2020 10:23 AM     No results found for: FREET4    Lab Results   Component Value Date/Time    WBC 7.2 12/18/2020 10:02 AM    WBC 13.4 (H) 12/27/2019 03:28 PM    RBC 5.01 12/18/2020 10:02 AM    RBC 5.20 12/27/2019 03:28 PM    HEMOGLOBIN 15.8 12/18/2020 10:02 AM    HEMOGLOBIN 16.0 12/27/2019 03:28 PM    HEMATOCRIT 46.4 12/18/2020 10:02 AM    HEMATOCRIT 45.9 12/27/2019 03:28 PM    MCV 93 12/18/2020 10:02 AM    MCV 88.3 12/27/2019 03:28 PM    MCH 31.5 12/18/2020 10:02 AM    MCH 30.8 12/27/2019 03:28 PM    MCHC 34.1 12/18/2020 10:02 AM    MCHC 34.9 12/27/2019 03:28 PM    MPV 11.9 12/27/2019 03:28 PM    NEUTSPOLYS 50 12/18/2020 10:02 AM    NEUTSPOLYS 81.50 (H) 12/27/2019 03:28 PM    LYMPHOCYTES 34 12/18/2020 10:02 AM    LYMPHOCYTES 10.40 (L) 12/27/2019 03:28 PM    MONOCYTES 8 12/18/2020 10:02 AM    MONOCYTES 5.70 12/27/2019 03:28 PM    EOSINOPHILS 7 12/18/2020 10:02 AM    EOSINOPHILS 1.30 12/27/2019 03:28 PM    BASOPHILS 1 12/18/2020 10:02 AM    BASOPHILS 0.60 12/27/2019 03:28 PM      Imaging      Reviewed and discussed per HPI    Assessment and Plan     Chaparrita Maciel is a 60 y.o. female    1. Dyslipidemia  FLP was wnl, continue current lifestyle    2. Hepatic steatosis  Pending:  - GAMMA GT (GGT); Future  3. Transaminitis  As above    4. Elevated alkaline phosphatase level  Pending labs  - ALKALINE PHOSPHATASE ISOENZYMES; Future    5. IFG (impaired fasting glucose)  Pending labs.  Discussed about risk to develop DM.   Advised low carb diet, exercise, watch for WT.   - HEMOGLOBIN A1C; Future    6. Hypovitaminosis D  Stable, continue current supplement    7. Thrombocytopenia (HCC)  Follow-up labs  - CBC WITH DIFFERENTIAL; Future  - REFERRAL TO HEMATOLOGY ONCOLOGY    8. Obesity (BMI 30.0-34.9)  - Patient identified as having  weight management issue.  Appropriate orders and counseling given.    Follow-up: Within 2 weeks, labs

## 2021-01-25 ENCOUNTER — TELEPHONE (OUTPATIENT)
Dept: HEMATOLOGY ONCOLOGY | Facility: MEDICAL CENTER | Age: 61
End: 2021-01-25

## 2021-01-25 NOTE — TELEPHONE ENCOUNTER
Called Pt to prescreen for appt on 01/26/2021. Patient states that they called their insurance and that they were notified that they do not cover our office. Relayed Tax ID number to the patient and patient stated they will call their insurance to ensure the information they received is correct. Pt also stated they would call us back after they speak to their insurance.

## 2021-01-26 ENCOUNTER — APPOINTMENT (OUTPATIENT)
Dept: HEMATOLOGY ONCOLOGY | Facility: MEDICAL CENTER | Age: 61
End: 2021-01-26
Payer: COMMERCIAL

## 2021-02-05 ENCOUNTER — OFFICE VISIT (OUTPATIENT)
Dept: URGENT CARE | Facility: CLINIC | Age: 61
End: 2021-02-05
Payer: COMMERCIAL

## 2021-02-05 VITALS
WEIGHT: 185 LBS | OXYGEN SATURATION: 96 % | BODY MASS INDEX: 31.58 KG/M2 | HEART RATE: 86 BPM | DIASTOLIC BLOOD PRESSURE: 100 MMHG | TEMPERATURE: 97.8 F | HEIGHT: 64 IN | RESPIRATION RATE: 18 BRPM | SYSTOLIC BLOOD PRESSURE: 186 MMHG

## 2021-02-05 DIAGNOSIS — R03.0 ELEVATED BLOOD PRESSURE READING: ICD-10-CM

## 2021-02-05 DIAGNOSIS — H92.03 EAR PAIN, BILATERAL: ICD-10-CM

## 2021-02-05 DIAGNOSIS — Z86.69 HISTORY OF FREQUENT EAR INFECTIONS: ICD-10-CM

## 2021-02-05 PROCEDURE — 99214 OFFICE O/P EST MOD 30 MIN: CPT | Performed by: PHYSICIAN ASSISTANT

## 2021-02-05 RX ORDER — AMOXICILLIN AND CLAVULANATE POTASSIUM 875; 125 MG/1; MG/1
1 TABLET, FILM COATED ORAL 2 TIMES DAILY
Qty: 14 TAB | Refills: 0 | Status: SHIPPED | OUTPATIENT
Start: 2021-02-05 | End: 2021-02-12

## 2021-02-05 ASSESSMENT — ENCOUNTER SYMPTOMS
COUGH: 0
ABDOMINAL PAIN: 0
HEADACHES: 0
DIARRHEA: 0
FEVER: 0
NAUSEA: 0
DIZZINESS: 0
SHORTNESS OF BREATH: 0
VOMITING: 0
SORE THROAT: 0
MUSCULOSKELETAL NEGATIVE: 1
CHILLS: 0
RHINORRHEA: 0

## 2021-02-05 ASSESSMENT — FIBROSIS 4 INDEX: FIB4 SCORE: 5.82

## 2021-02-05 NOTE — PROGRESS NOTES
"Subjective:      Latisha Maciel is a 60 y.o. female who presents with Otalgia (bilateral, x2 weeks ) and Dizziness            Otalgia   There is pain in both ears. This is a recurrent problem. The current episode started 1 to 4 weeks ago (2 weeks). The problem occurs constantly. The problem has been unchanged. There has been no fever. The pain is at a severity of 3/10. The pain is mild. Pertinent negatives include no abdominal pain, coughing, diarrhea, ear discharge, headaches, hearing loss, rash, rhinorrhea, sore throat or vomiting. She has tried nothing for the symptoms. There is no history of a chronic ear infection or hearing loss.     Patient presents to urgent care reporting a 2 week history of bilateral ear pain and muffles sensation. She has a history of similar ear issues, usually starting \"anytime the wind blows\". She's taken antibiotics in the past with good relief. She was recently seen via telemedicine visit and given a 10 day course of amoxicillin, which she finished as instructed without any relief. Associated symptoms include dizziness with rapid head movements.     She does admit to a long history of elevated blood pressure readings. She has been prescribed different blood pressure medications multiple times in the past but admits she won't take them for fear of a bad adverse reaction. She denies chest pain, palpitations, SOB, headaches, change in vision, or lower extremity edema.       Review of Systems   Constitutional: Negative for chills and fever.   HENT: Positive for ear pain. Negative for congestion, ear discharge, hearing loss, rhinorrhea and sore throat.    Respiratory: Negative for cough and shortness of breath.    Cardiovascular: Negative for chest pain.   Gastrointestinal: Negative for abdominal pain, diarrhea, nausea and vomiting.   Genitourinary: Negative.    Musculoskeletal: Negative.    Skin: Negative for rash.   Neurological: Negative for dizziness and headaches.        Objective: " "    BP (!) 186/100   Pulse 86   Temp 36.6 °C (97.8 °F) (Temporal)   Resp 18   Ht 1.626 m (5' 4\")   Wt 83.9 kg (185 lb)   LMP 03/09/2011   SpO2 96%   BMI 31.76 kg/m²        Physical Exam  Vitals signs and nursing note reviewed.   Constitutional:       General: She is not in acute distress.     Appearance: Normal appearance. She is well-developed. She is not diaphoretic.   HENT:      Head: Normocephalic and atraumatic.      Right Ear: Tympanic membrane, ear canal and external ear normal. There is no impacted cerumen.      Left Ear: Tympanic membrane, ear canal and external ear normal. There is no impacted cerumen.      Nose: No mucosal edema, congestion or rhinorrhea.      Right Sinus: No maxillary sinus tenderness or frontal sinus tenderness.      Left Sinus: No maxillary sinus tenderness or frontal sinus tenderness.   Eyes:      Conjunctiva/sclera: Conjunctivae normal.      Pupils: Pupils are equal, round, and reactive to light.   Neck:      Musculoskeletal: Normal range of motion.   Cardiovascular:      Rate and Rhythm: Normal rate and regular rhythm.      Heart sounds: Normal heart sounds. No murmur.   Pulmonary:      Effort: Pulmonary effort is normal.      Breath sounds: Normal breath sounds. No wheezing or rales.   Musculoskeletal: Normal range of motion.      Right lower leg: No edema.      Left lower leg: No edema.   Skin:     General: Skin is warm and dry.   Neurological:      Mental Status: She is alert and oriented to person, place, and time.   Psychiatric:         Behavior: Behavior normal.          PMH:  has a past medical history of Hypertension, Migraine, Mitral valve prolapse, Palpitations, Sarcoid, and Sarcoidosis, lung (HCC).  MEDS:   Current Outpatient Medications:   •  amoxicillin-clavulanate (AUGMENTIN) 875-125 MG Tab, Take 1 Tab by mouth 2 times a day for 7 days., Disp: 14 Tab, Rfl: 0  •  butalbital-acetaminophen-caffeine-codeine (FIORICET W/CODEINE) -28-30 MG per capsule, , Disp: " , Rfl:   •  multivitamin (THERAGRAN) Tab, Take 1 Tab by mouth every day., Disp: , Rfl:   •  VITAMIN D PO, Take 1,000 mg by mouth 4 times a day., Disp: , Rfl:   •  losartan (COZAAR) 25 MG Tab, Take 1 Tab by mouth every day., Disp: 30 Tab, Rfl: 0  •  aspirin (ASA) 81 MG Chew Tab chewable tablet, Take 81 mg by mouth every day., Disp: , Rfl:   ALLERGIES:   Allergies   Allergen Reactions   • Epinephrine    • Iodine    • Morphine Anaphylaxis and Vomiting   • Percocet [Perloxx]    • Shellfish Allergy      [place]   • Sulfa Drugs      Sick   • Tetracycline      Sick     • Vicodin [Hydrocodone-Acetaminophen]      SURGHX: History reviewed. No pertinent surgical history.  SOCHX:  reports that she has never smoked. She has never used smokeless tobacco. She reports that she does not drink alcohol or use drugs.  FH: family history includes Diabetes in her brother; Hypertension in her brother and father.       Assessment/Plan:        1. Ear pain, bilateral    - amoxicillin-clavulanate (AUGMENTIN) 875-125 MG Tab; Take 1 Tab by mouth 2 times a day for 7 days.  Dispense: 14 Tab; Refill: 0    Advised patient there is no evidence of AOM or AOE at today's visit. Encouraged use of OTC decongestant, antihistamines, and flonase nasal spray. Discussed performing sinus rinses as well. Contingent course of antibiotics provided to start taking if symptoms persist/worsen. She will follow up with ENT for further evaluation and management. The patient demonstrated a good understanding and agreed with the treatment plan.    2. History of frequent ear infections    - REFERRAL TO ENT    3. Elevated blood pressure reading    Discussed with patient elevated blood pressure reading at 186/100. She states her blood pressure has been more elevated than normal recently. She has a prescription for losartan at home that she promises she will start taking as soon as she gets home. Encouraged to keep a journal with daily blood pressure measurements for her to  bring with her to her next PCP appointment. ED precautions discussed at length for blood pressure readings >180/110, or for development of chest pain, palpitations, SOB, headaches, change in vision, dizziness, etc. The patient demonstrated a good understanding and agreed with the treatment plan.

## 2021-02-08 ENCOUNTER — APPOINTMENT (OUTPATIENT)
Dept: RADIOLOGY | Facility: MEDICAL CENTER | Age: 61
End: 2021-02-08
Attending: EMERGENCY MEDICINE
Payer: COMMERCIAL

## 2021-02-08 ENCOUNTER — NURSE TRIAGE (OUTPATIENT)
Dept: HEALTH INFORMATION MANAGEMENT | Facility: OTHER | Age: 61
End: 2021-02-08

## 2021-02-08 ENCOUNTER — HOSPITAL ENCOUNTER (EMERGENCY)
Facility: MEDICAL CENTER | Age: 61
End: 2021-02-08
Attending: EMERGENCY MEDICINE
Payer: COMMERCIAL

## 2021-02-08 VITALS
DIASTOLIC BLOOD PRESSURE: 117 MMHG | RESPIRATION RATE: 16 BRPM | BODY MASS INDEX: 31.95 KG/M2 | OXYGEN SATURATION: 98 % | TEMPERATURE: 97.9 F | HEART RATE: 90 BPM | SYSTOLIC BLOOD PRESSURE: 174 MMHG | HEIGHT: 64 IN | WEIGHT: 187.17 LBS

## 2021-02-08 DIAGNOSIS — R19.7 ACUTE DIARRHEA: ICD-10-CM

## 2021-02-08 DIAGNOSIS — R07.9 CHEST PAIN, UNSPECIFIED TYPE: ICD-10-CM

## 2021-02-08 DIAGNOSIS — I10 ESSENTIAL HYPERTENSION: ICD-10-CM

## 2021-02-08 LAB
ALBUMIN SERPL BCP-MCNC: 4.4 G/DL (ref 3.2–4.9)
ALBUMIN/GLOB SERPL: 1.2 G/DL
ALP SERPL-CCNC: 193 U/L (ref 30–99)
ALT SERPL-CCNC: 51 U/L (ref 2–50)
ANION GAP SERPL CALC-SCNC: 12 MMOL/L (ref 7–16)
AST SERPL-CCNC: 57 U/L (ref 12–45)
BASOPHILS # BLD AUTO: 0.8 % (ref 0–1.8)
BASOPHILS # BLD: 0.07 K/UL (ref 0–0.12)
BILIRUB SERPL-MCNC: 0.8 MG/DL (ref 0.1–1.5)
BUN SERPL-MCNC: 8 MG/DL (ref 8–22)
CALCIUM SERPL-MCNC: 9.6 MG/DL (ref 8.4–10.2)
CHLORIDE SERPL-SCNC: 103 MMOL/L (ref 96–112)
CO2 SERPL-SCNC: 24 MMOL/L (ref 20–33)
CREAT SERPL-MCNC: 0.78 MG/DL (ref 0.5–1.4)
EKG IMPRESSION: NORMAL
EOSINOPHIL # BLD AUTO: 0.43 K/UL (ref 0–0.51)
EOSINOPHIL NFR BLD: 4.7 % (ref 0–6.9)
ERYTHROCYTE [DISTWIDTH] IN BLOOD BY AUTOMATED COUNT: 37.1 FL (ref 35.9–50)
GLOBULIN SER CALC-MCNC: 3.8 G/DL (ref 1.9–3.5)
GLUCOSE SERPL-MCNC: 161 MG/DL (ref 65–99)
HCT VFR BLD AUTO: 47.6 % (ref 37–47)
HGB BLD-MCNC: 16.5 G/DL (ref 12–16)
IMM GRANULOCYTES # BLD AUTO: 0.04 K/UL (ref 0–0.11)
IMM GRANULOCYTES NFR BLD AUTO: 0.4 % (ref 0–0.9)
LIPASE SERPL-CCNC: 22 U/L (ref 7–58)
LYMPHOCYTES # BLD AUTO: 2.18 K/UL (ref 1–4.8)
LYMPHOCYTES NFR BLD: 24 % (ref 22–41)
MCH RBC QN AUTO: 31.4 PG (ref 27–33)
MCHC RBC AUTO-ENTMCNC: 34.7 G/DL (ref 33.6–35)
MCV RBC AUTO: 90.5 FL (ref 81.4–97.8)
MONOCYTES # BLD AUTO: 0.63 K/UL (ref 0–0.85)
MONOCYTES NFR BLD AUTO: 6.9 % (ref 0–13.4)
NEUTROPHILS # BLD AUTO: 5.72 K/UL (ref 2–7.15)
NEUTROPHILS NFR BLD: 63.2 % (ref 44–72)
NRBC # BLD AUTO: 0 K/UL
NRBC BLD-RTO: 0 /100 WBC
PLATELET # BLD AUTO: 251 K/UL (ref 164–446)
PMV BLD AUTO: 12.6 FL (ref 9–12.9)
POTASSIUM SERPL-SCNC: 3.7 MMOL/L (ref 3.6–5.5)
PROT SERPL-MCNC: 8.2 G/DL (ref 6–8.2)
RBC # BLD AUTO: 5.26 M/UL (ref 4.2–5.4)
SODIUM SERPL-SCNC: 139 MMOL/L (ref 135–145)
TROPONIN T SERPL-MCNC: 7 NG/L (ref 6–19)
WBC # BLD AUTO: 9.1 K/UL (ref 4.8–10.8)

## 2021-02-08 PROCEDURE — 99283 EMERGENCY DEPT VISIT LOW MDM: CPT

## 2021-02-08 PROCEDURE — 84484 ASSAY OF TROPONIN QUANT: CPT

## 2021-02-08 PROCEDURE — 93005 ELECTROCARDIOGRAM TRACING: CPT

## 2021-02-08 PROCEDURE — 80053 COMPREHEN METABOLIC PANEL: CPT

## 2021-02-08 PROCEDURE — 83690 ASSAY OF LIPASE: CPT

## 2021-02-08 PROCEDURE — 71045 X-RAY EXAM CHEST 1 VIEW: CPT

## 2021-02-08 PROCEDURE — 85025 COMPLETE CBC W/AUTO DIFF WBC: CPT

## 2021-02-08 PROCEDURE — 93005 ELECTROCARDIOGRAM TRACING: CPT | Performed by: EMERGENCY MEDICINE

## 2021-02-08 ASSESSMENT — FIBROSIS 4 INDEX: FIB4 SCORE: 5.82

## 2021-02-08 NOTE — TELEPHONE ENCOUNTER
"Started Losartan on 2/6, 6 hours later started feeling sick, sick to stomach, HA, a little trouble breathing, legs ache, chest began hurting last night, hurting worse now.  Pain underneath left breast.      Reason for Disposition  • Chest pain lasting longer than 5 minutes and ANY of the following:* Over 50 years old* Over 30 years old and at least one cardiac risk factor (i.e., high blood pressure, diabetes, high cholesterol, obesity, smoker or strong family history of heart disease)* Pain is crushing, pressure-like, or heavy * Took nitroglycerin and chest pain was not relieved* History of heart disease (i.e., angina, heart attack, bypass surgery, angioplasty, CHF)    Answer Assessment - Initial Assessment Questions  1. LOCATION: \"Where does it hurt?\"        Underneath left breast  2. RADIATION: \"Does the pain go anywhere else?\" (e.g., into neck, jaw, arms, back)      Hurting from left ear to neck to bottom of jaw, this started @ 2 p.m. today  3. ONSET: \"When did the chest pain begin?\" (Minutes, hours or days)       10 p.m. on 2/7  4. PATTERN \"Does the pain come and go, or has it been constant since it started?\"  \"Does it get worse with exertion?\"       Comes & goes, getting worse, no exertion just trying to relaxed  5. DURATION: \"How long does it last\" (e.g., seconds, minutes, hours)      Can last 15 minutes to 30 minutes  6. SEVERITY: \"How bad is the pain?\"  (e.g., Scale 1-10; mild, moderate, or severe)     - MILD (1-3): doesn't interfere with normal activities      - MODERATE (4-7): interferes with normal activities or awakens from sleep     - SEVERE (8-10): excruciating pain, unable to do any normal activities        5  7. CARDIAC RISK FACTORS: \"Do you have any history of heart problems or risk factors for heart disease?\" (e.g., prior heart attack, angina; high blood pressure, diabetes, being overweight, high cholesterol, smoking, or strong family history of heart disease)      HTN, afib, tired a couple of BP " "meds before, had reactins but not chest pain  8. PULMONARY RISK FACTORS: \"Do you have any history of lung disease?\"  (e.g., blood clots in lung, asthma, emphysema, birth control pills)      sarcodosis  9. CAUSE: \"What do you think is causing the chest pain?\"      Maybe related to Losartan  10. OTHER SYMPTOMS: \"Do you have any other symptoms?\" (e.g., dizziness, nausea, vomiting, sweating, fever, difficulty breathing, cough)        HA, sick to stomach, legs ache, taken 3 Losartan so far  11. PREGNANCY: \"Is there any chance you are pregnant?\" \"When was your last menstrual period?\"        NA    Protocols used: CHEST PAIN-A-[place]      "

## 2021-02-09 NOTE — ED NOTES
Agreeable to discharge.  Follow up instructions (with cardiology for continued eval/treatment of HTN) discussed and understanding verbalized.  Ambulatory out of ED.

## 2021-02-09 NOTE — ED PROVIDER NOTES
ED Provider Note    ED Provider Note    Scribed for Jose Trujillo MD by Jose Trujillo M.D.. 2/8/2021, 5:50 PM.    Primary care provider: Anish Plata M.D.  Means of arrival: Private  History obtained from: Patient  History limited by: None    CHIEF COMPLAINT  Chief Complaint   Patient presents with   • Diarrhea      x 10 /24hrs Loose stool No blood Since Sat afternoon  Started after taking losartan for HTN No N/V    • Chest Pain     Lt ant chest under breast No radiation Some MAKAYLA       HPI  Chaparrita Maciel is a 60 y.o. female who presents to the Emergency Department for evaluation of 2 concerns.  Patient relates to me starting 2 days ago in the afternoon she began having diarrheal stools.  She notes approximately 10 loose bowel movements over the last 24-hour period.  Denies any hematochezia.  Patient notes this started about 6 hours after taking the first dose of the new blood pressure medication, losartan.  Patient has history of chronic hypertension and notes difficulty controlling her blood pressure and has been through multiple different medications.  Patient endorses as well for the same time she has had discomfort to the anterior chest, on the left side just medial to the left breast.  No radiation to the neck or back or arm.  She notes no acute dyspnea.  No cough, no fever, no chills.  She notes he does have a follow-up appointment cardiology with regard to her chronic hypertension in 4 days.  No syncope, no history of any chest trauma.  She has had no vomiting, and notes no significant abdominal pain    REVIEW OF SYSTEMS  Pertinent positives include acute diarrhea after starting the medication, left chest discomfort, ear discomfort more with cold exposure for the last 2 weeks. Pertinent negatives include no exertional component, no acute dyspnea, no fever, no abdominal pain, no vomiting.  All other systems reviewed and negative.    PAST MEDICAL HISTORY   has a past medical  "history of Hypertension, Migraine, Mitral valve prolapse, Palpitations, Sarcoid, and Sarcoidosis, lung (HCC).    SURGICAL HISTORY  patient denies any surgical history    SOCIAL HISTORY  Social History     Tobacco Use   • Smoking status: Never Smoker   • Smokeless tobacco: Never Used   Substance Use Topics   • Alcohol use: No   • Drug use: No      Social History     Substance and Sexual Activity   Drug Use No       FAMILY HISTORY  Family History   Problem Relation Age of Onset   • Hypertension Father    • Hypertension Brother    • Diabetes Brother    • Hyperlipidemia Neg Hx        CURRENT MEDICATIONS  Home Medications    **Home medications have not yet been reviewed for this encounter**         ALLERGIES  Allergies   Allergen Reactions   • Epinephrine    • Iodine    • Morphine Anaphylaxis and Vomiting   • Percocet [Perloxx]    • Shellfish Allergy      Avacados   • Sulfa Drugs      Sick   • Tetracycline      Sick     • Vicodin [Hydrocodone-Acetaminophen]        PHYSICAL EXAM  VITAL SIGNS: BP (!) 174/117   Pulse 90   Temp 36.6 °C (97.9 °F) (Temporal)   Resp 16   Ht 1.626 m (5' 4\")   Wt 84.9 kg (187 lb 2.7 oz)   LMP 03/09/2011   SpO2 98%   BMI 32.13 kg/m²     General: Alert, no acute distress  Skin: Warm, dry, normal for ethnicity  Head: Normocephalic, atraumatic  Neck: Trachea midline, no tenderness  Eye: PERRL, normal conjunctiva  ENMT: Tympanic membranes are pearly-gray unremarkable bilaterally, no erythema nor induration of the external auditory canal  Cardiovascular: Regular rate and rhythm, No murmur, Normal peripheral perfusion  Respiratory: Lungs CTA, respirations are non-labored, breath sounds are equal  Gastrointestinal: Soft, nontender, non distended; bowel sounds mildly hyperactive.  Musculoskeletal: No swelling, no deformity  Neurological: Alert and oriented to person, place, time, and situation  Lymphatics: No lymphadenopathy  Psychiatric: Cooperative, mildly anxious, otherwise appropriate mood & " affect      DIAGNOSTIC STUDIES/PROCEDURES    LABS  Results for orders placed or performed during the hospital encounter of 02/08/21   CBC with Differential   Result Value Ref Range    WBC 9.1 4.8 - 10.8 K/uL    RBC 5.26 4.20 - 5.40 M/uL    Hemoglobin 16.5 (H) 12.0 - 16.0 g/dL    Hematocrit 47.6 (H) 37.0 - 47.0 %    MCV 90.5 81.4 - 97.8 fL    MCH 31.4 27.0 - 33.0 pg    MCHC 34.7 33.6 - 35.0 g/dL    RDW 37.1 35.9 - 50.0 fL    Platelet Count 251 164 - 446 K/uL    MPV 12.6 9.0 - 12.9 fL    Neutrophils-Polys 63.20 44.00 - 72.00 %    Lymphocytes 24.00 22.00 - 41.00 %    Monocytes 6.90 0.00 - 13.40 %    Eosinophils 4.70 0.00 - 6.90 %    Basophils 0.80 0.00 - 1.80 %    Immature Granulocytes 0.40 0.00 - 0.90 %    Nucleated RBC 0.00 /100 WBC    Neutrophils (Absolute) 5.72 2.00 - 7.15 K/uL    Lymphs (Absolute) 2.18 1.00 - 4.80 K/uL    Monos (Absolute) 0.63 0.00 - 0.85 K/uL    Eos (Absolute) 0.43 0.00 - 0.51 K/uL    Baso (Absolute) 0.07 0.00 - 0.12 K/uL    Immature Granulocytes (abs) 0.04 0.00 - 0.11 K/uL    NRBC (Absolute) 0.00 K/uL   Complete Metabolic Panel (CMP)   Result Value Ref Range    Sodium 139 135 - 145 mmol/L    Potassium 3.7 3.6 - 5.5 mmol/L    Chloride 103 96 - 112 mmol/L    Co2 24 20 - 33 mmol/L    Anion Gap 12.0 7.0 - 16.0    Glucose 161 (H) 65 - 99 mg/dL    Bun 8 8 - 22 mg/dL    Creatinine 0.78 0.50 - 1.40 mg/dL    Calcium 9.6 8.4 - 10.2 mg/dL    AST(SGOT) 57 (H) 12 - 45 U/L    ALT(SGPT) 51 (H) 2 - 50 U/L    Alkaline Phosphatase 193 (H) 30 - 99 U/L    Total Bilirubin 0.8 0.1 - 1.5 mg/dL    Albumin 4.4 3.2 - 4.9 g/dL    Total Protein 8.2 6.0 - 8.2 g/dL    Globulin 3.8 (H) 1.9 - 3.5 g/dL    A-G Ratio 1.2 g/dL   Troponin   Result Value Ref Range    Troponin T 7 6 - 19 ng/L   LIPASE   Result Value Ref Range    Lipase 22 7 - 58 U/L   ESTIMATED GFR   Result Value Ref Range    GFR If African American >60 >60 mL/min/1.73 m 2    GFR If Non African American >60 >60 mL/min/1.73 m 2   EKG   Result Value Ref Range    Report        [facility] Emergency Dept.    Test Date:  2021  Pt Name:    WILLIAM COLE               Department: [facility]  MRN:        [id number]                      Room:  Gender:     Female                       Technician: MANUELA  :        1960                   Requested By:ER TRIAGE PROTOCOL  Order #:    [id number]                    Reading MD: KENZIE CARBAJAL MD    Measurements  Intervals                                Axis  Rate:       69                           P:          10  WV:         148                          QRS:        -15  QRSD:       84                           T:          47  QT:         424  QTc:        455    Interpretive Statements  SINUS RHYTHM  LEFT VENTRICULAR HYPERTROPHY  Compared to ECG 2017 13:17:40  Q waves no longer present  Electronically Signed On 2021 18:19:49 PST by KENZIE CARBAJAL MD       All labs reviewed by me, similar to previous values.    EKG  12 Lead EKG obtained at 1503 and interpreted by me to show:  Rhythm: Normal sinus rhythm   Rate: 69  Axis: Left  Intervals: Normal  Q Waves: Normal  No diagnostic ST segment elevation  Criteria for left ventricular hypertrophy, otherwise unremarkable  Clinical Impression: Normal EKG  Compared to 2017    RADIOLOGY  DX-CHEST-PORTABLE (1 VIEW)   Final Result      No acute cardiac or pulmonary abnormality is noted.        The radiologist's interpretation of all radiological studies have been reviewed by me.    COURSE & MEDICAL DECISION MAKING  Pertinent Labs & Imaging studies reviewed. (See chart for details)    5:50 PM - Patient seen and examined at bedside. Ordered cardiac work-up to evaluate her symptoms. The differential diagnoses include but are not limited to: Essential hypertension, ACS, gastroenteritis, medication side effect, electrolyte normality    1800: Blood pressure improving slightly without intervention, currently 174/117.  No evidence of acute kidney injury,  "troponin is normal, she has no neurologic deficits; not consistent with hypertensive emergency.  Patient is comfortable with outpatient management and relieved to hear of otherwise unremarkable studies here in the ED.    Patient Vitals for the past 24 hrs:   BP Temp Temp src Pulse Resp SpO2 Height Weight   02/08/21 1804 (!) 174/117 -- -- 90 -- 98 % -- --   02/08/21 1510 (!) 191/118 36.6 °C (97.9 °F) Temporal 92 16 97 % 1.626 m (5' 4\") 84.9 kg (187 lb 2.7 oz)     HTN/IDDM FOLLOW UP:  The patient has known hypertension and is being followed by their primary care doctor    Decision Making:  This is a 60 y.o. year old female who presents with chest discomfort without exertion for the last 2 days.  She has no history of coronary artery disease nor is or one of the family.  She has history of poorly controlled hypertension and is hypertensive upon arrival, she describes similar blood pressure readings at home.  EKG is nonischemic, troponin is unremarkable, presentation otherwise is certainly not typical for ACS.  Her heart score is only 2, low risk stratification, doubt ACS.  I do suspect likely her diarrhea could well be secondary to medication side effect, serving, and with losartan.  She has no peritoneal signs, no leukocytosis, no other acute changes and her metabolic panel would be concerning for intra-abdominal or surgical process.  No indication for inpatient management at this time.    The patient will return for new or worsening symptoms and is stable at the time of discharge.    Patient has had high blood pressure while in the emergency department, felt likely secondary to medical condition. Counseled patient to monitor blood pressure at home and follow up with primary care physician.     DISPOSITION:  Patient will be discharged home in stable condition.    FOLLOW UP:  Anish Plata M.D.  [address] [place] [address] Dr Mark [place] [id number] [phone]    Schedule an appointment as soon as possible for a visit       Your " established cardiologist            OUTPATIENT MEDICATIONS:  Discharge Medication List as of 2/8/2021  6:25 PM            FINAL IMPRESSION  1. Chest pain, unspecified type    2. Essential hypertension    3. Acute diarrhea          Jose PEREZ M.D. (Scribe), am scribing for, and in the presence of, Jose Trujillo MD.    Electronically signed by: Jose Trujillo M.D. (Scribe), 2/8/2021    IJose MD personally performed the services described in this documentation, as scribed by Jose Trujillo M.D. in my presence, and it is both accurate and complete    The note accurately reflects work and decisions made by me.  Jose Trujillo M.D.  2/8/2021  6:35 PM

## 2021-02-26 ENCOUNTER — PATIENT MESSAGE (OUTPATIENT)
Dept: MEDICAL GROUP | Age: 61
End: 2021-02-26

## 2021-02-26 DIAGNOSIS — Z12.11 SCREENING FOR MALIGNANT NEOPLASM OF COLON: ICD-10-CM

## 2021-02-26 NOTE — PATIENT COMMUNICATION
1. Caller Name: Chaparrita Maciel                        Call Back Number: [phone] (home)       How would the patient prefer to be contacted with a response: Someecardshart message    2. SPECIFIC Action To Be Taken: Referral pending, please sign.    3. Diagnosis/Clinical Reason for Request: Z12.11 (ICD-10-CM) - Screening for malignant neoplasm of colon    4. Specialty & Provider Name/Lab/Imaging Location: [facility] They are needing a new referral for this patient    5. Is appointment scheduled for requested order/referral: no    Patient was not informed they will receive a return phone call from the office ONLY if there are any questions before processing their request. Advised to call back if they haven't received a call from the referral department in 5 days.

## 2021-02-26 NOTE — TELEPHONE ENCOUNTER
From: Chaparrita Maciel  To: Physician Anish Plata  Sent: 2/26/2021 8:24 AM PST  Subject: Non-Urgent Medical Question    Can you resend my referral to [facility]. I would like to mKe an appt with them

## 2021-03-01 ENCOUNTER — HOSPITAL ENCOUNTER (OUTPATIENT)
Dept: RADIOLOGY | Facility: MEDICAL CENTER | Age: 61
End: 2021-03-01
Attending: NURSE PRACTITIONER
Payer: COMMERCIAL

## 2021-03-01 DIAGNOSIS — R10.33 ABDOMINAL PAIN, PERIUMBILICAL: ICD-10-CM

## 2021-03-01 PROCEDURE — 74176 CT ABD & PELVIS W/O CONTRAST: CPT

## 2021-03-15 DIAGNOSIS — Z23 NEED FOR VACCINATION: ICD-10-CM

## 2021-04-02 LAB
ALP BONE CFR SERPL: 58 % (ref 14–68)
ALP INTEST CFR SERPL: 2 % (ref 0–18)
ALP LIVER CFR SERPL: 40 % (ref 18–85)
ALP SERPL-CCNC: 142 IU/L (ref 39–117)
BASOPHILS # BLD AUTO: 0.1 X10E3/UL (ref 0–0.2)
BASOPHILS NFR BLD AUTO: 1 %
EOSINOPHIL # BLD AUTO: 0.4 X10E3/UL (ref 0–0.4)
EOSINOPHIL NFR BLD AUTO: 6 %
ERYTHROCYTE [DISTWIDTH] IN BLOOD BY AUTOMATED COUNT: 11.7 % (ref 11.7–15.4)
GGT SERPL-CCNC: 57 IU/L (ref 0–60)
HBA1C MFR BLD: 7.3 % (ref 4.8–5.6)
HCT VFR BLD AUTO: 44.6 % (ref 34–46.6)
HGB BLD-MCNC: 15.5 G/DL (ref 11.1–15.9)
IMM GRANULOCYTES # BLD AUTO: 0 X10E3/UL (ref 0–0.1)
IMM GRANULOCYTES NFR BLD AUTO: 0 %
IMMATURE CELLS  115398: NORMAL
LYMPHOCYTES # BLD AUTO: 1.8 X10E3/UL (ref 0.7–3.1)
LYMPHOCYTES NFR BLD AUTO: 25 %
MCH RBC QN AUTO: 32.4 PG (ref 26.6–33)
MCHC RBC AUTO-ENTMCNC: 34.8 G/DL (ref 31.5–35.7)
MCV RBC AUTO: 93 FL (ref 79–97)
MONOCYTES # BLD AUTO: 0.5 X10E3/UL (ref 0.1–0.9)
MONOCYTES NFR BLD AUTO: 7 %
MORPHOLOGY BLD-IMP: NORMAL
NEUTROPHILS # BLD AUTO: 4.3 X10E3/UL (ref 1.4–7)
NEUTROPHILS NFR BLD AUTO: 61 %
NRBC BLD AUTO-RTO: NORMAL %
PLATELET # BLD AUTO: NORMAL X10E3/UL
RBC # BLD AUTO: 4.79 X10E6/UL (ref 3.77–5.28)
WBC # BLD AUTO: 7.2 X10E3/UL (ref 3.4–10.8)

## 2021-04-07 ENCOUNTER — TELEPHONE (OUTPATIENT)
Dept: MEDICAL GROUP | Age: 61
End: 2021-04-07

## 2021-04-07 NOTE — TELEPHONE ENCOUNTER
ESTABLISHED PATIENT PRE-VISIT PLANNING     Patient was NOT contacted to complete PVP.     Note: Patient will not be contacted if there is no indication to call.     1.  Reviewed notes from the last few office visits within the medical group: Yes    2.  If any orders were placed at last visit or intended to be done for this visit (i.e. 6 mos follow-up), do we have Results/Consult Notes?         •  Labs - Labs ordered, completed on 3/23/2021 and results are in chart.  Note: If patient appointment is for lab review and patient did not complete labs, check with provider if OK to reschedule patient until labs completed.       •  Imaging - Imaging ordered, completed and results are in chart.       •  Referrals - Referral ordered, patient was seen and consult notes are in chart. Care Teams updated  YES.    3. Is this appointment scheduled as a Hospital Follow-Up? No    4.  Immunizations were updated in Epic using Reconcile Outside Information activity? Yes    5.  Patient is due for the following Health Maintenance Topics:   Health Maintenance Due   Topic Date Due   • IMM PNEUMOCOCCAL VACCINE: 0-64 Years (1 of 1 - PPSV23) Never done   • COVID-19 Vaccine (1) Never done   • IMM DTaP/Tdap/Td Vaccine (1 - Tdap) Never done   • COLONOSCOPY  Never done   • IMM ZOSTER VACCINES (1 of 2) Never done       - Patient plans to schedule appointment for Colonoscopy/FIT.    6.  [facility]HA (Pulse8) form printed for Provider? N/A

## 2021-04-08 ENCOUNTER — TELEMEDICINE (OUTPATIENT)
Dept: MEDICAL GROUP | Age: 61
End: 2021-04-08
Payer: COMMERCIAL

## 2021-04-08 VITALS — HEIGHT: 64 IN | BODY MASS INDEX: 29.88 KG/M2 | WEIGHT: 175 LBS

## 2021-04-08 DIAGNOSIS — E11.65 UNCONTROLLED TYPE 2 DIABETES MELLITUS WITH HYPERGLYCEMIA (HCC): ICD-10-CM

## 2021-04-08 DIAGNOSIS — R74.8 ELEVATED ALKALINE PHOSPHATASE LEVEL: ICD-10-CM

## 2021-04-08 PROBLEM — R00.2 PALPITATIONS: Status: ACTIVE | Noted: 2021-01-14

## 2021-04-08 PROBLEM — I10 ESSENTIAL HYPERTENSION: Status: ACTIVE | Noted: 2021-01-14

## 2021-04-08 PROBLEM — R51.9 FREQUENT HEADACHES: Status: ACTIVE | Noted: 2021-03-12

## 2021-04-08 PROCEDURE — 99214 OFFICE O/P EST MOD 30 MIN: CPT | Mod: 95 | Performed by: FAMILY MEDICINE

## 2021-04-08 RX ORDER — AMLODIPINE BESYLATE 5 MG/1
TABLET ORAL
COMMUNITY
Start: 2021-03-12

## 2021-04-08 RX ORDER — METFORMIN HYDROCHLORIDE 500 MG/1
500 TABLET, EXTENDED RELEASE ORAL DAILY
Qty: 90 TABLET | Refills: 0 | Status: SHIPPED | OUTPATIENT
Start: 2021-04-08 | End: 2022-09-14

## 2021-04-08 NOTE — PROGRESS NOTES
Virtual Visit: Established Patient   This visit was conducted via Zoom using secure and encrypted videoconferencing technology. The patient was in a private location in the state of [place].    The patient's identity was confirmed and verbal consent was obtained for this virtual visit.    Subjective:   CC:   Chief Complaint   Patient presents with   • Lab Results       Chaparrita Maciel is a 60 y.o. female presenting for evaluation and management of:    1. Elevated alkaline phosphatase level        Results for MURPHY MACIEL (MRN [id number]) as of 4/8/2021 10:01   Ref. Range 2/8/2021 15:34   AST(SGOT) Latest Ref Range: 12 - 45 U/L 57 (H)   ALT(SGPT) Latest Ref Range: 2 - 50 U/L 51 (H)   Alkaline Phosphatase Latest Ref Range: 30 - 99 U/L 193 (H)     Results for MURPHY MACIEL (MRN [id number]) as of 4/8/2021 10:01   Ref. Range 3/24/2021 06:54   Gamma Gt Latest Ref Range: 0 - 60 IU/L 57       CT Abdomen 01/March, 2021       IMPRESSION:        1.  Acute sigmoid diverticulitis without evidence of perforation or intra-abdominal abscess.  2.  Hepatic steatosis.  3.  Distended gallbladder. No calcified stones. No biliary dilatation.                      Last Resulted: 03/01/21  5:35 PM             2. Uncontrolled type 2 diabetes mellitus with hyperglycemia (HCC)  NEW PROBLEM    The patient had routine labs done her A1c was elevated at the diabetic level.  She is never taken diabetic medication but she has not been exercising or watching her diet.  She denies any polydipsia no polyphagia no polyuria no numbness or tingling no unintentional weight loss      Results for MURPHY MACIEL (MRN [id number]) as of 4/8/2021 13:33   Ref. Range 3/24/2021 06:54   Glycohemoglobin Latest Ref Range: 4.8 - 5.6 % 7.3 (H)       ROS   Denies any recent fevers or chills. No nausea or vomiting. No chest pains or shortness of breath.     Allergies   Allergen Reactions   • Epinephrine    • Iodine    • Losartan Nausea   • Morphine Anaphylaxis and Vomiting   •  "Percocet [Perloxx]    • Shellfish Allergy      Avacados   • Sulfa Drugs      Sick   • Tetracycline      Sick     • Vicodin [Hydrocodone-Acetaminophen]        Current medicines (including changes today)  Current Outpatient Medications   Medication Sig Dispense Refill   • amLODIPine (NORVASC) 5 MG Tab      • metFORMIN ER (GLUCOPHAGE XR) 500 MG TABLET SR 24 HR Take 1 tablet by mouth every day. 90 tablet 0   • butalbital-acetaminophen-caffeine-codeine (FIORICET W/CODEINE) -20-30 MG per capsule      • multivitamin (THERAGRAN) Tab Take 1 Tab by mouth every day.     • VITAMIN D PO Take 1,000 mg by mouth 4 times a day.     • aspirin (ASA) 81 MG Chew Tab chewable tablet Take 81 mg by mouth every day.     • losartan (COZAAR) 25 MG Tab Take 1 Tab by mouth every day. 30 Tab 0     No current facility-administered medications for this visit.       Patient Active Problem List    Diagnosis Date Noted   • Elevated alkaline phosphatase level 04/08/2021   • Uncontrolled type 2 diabetes mellitus with hyperglycemia (HCC) 04/08/2021   • Dyslipidemia 01/18/2021   • Hepatic steatosis 01/18/2021   • Intermittent asthma without complication 11/23/2020   • Sarcoidosis of lung (HCC) 11/23/2020       Family History   Problem Relation Age of Onset   • Hypertension Father    • Hypertension Brother    • Diabetes Brother    • Hyperlipidemia Neg Hx        She  has a past medical history of Hypertension, Migraine, Mitral valve prolapse, Palpitations, Sarcoid, and Sarcoidosis, lung (HCC).  She  has no past surgical history on file.       Objective:   Ht 1.626 m (5' 4\") Comment: pt states  Wt 79.4 kg (175 lb) Comment: pt states  LMP 03/09/2011   BMI 30.04 kg/m²     Physical Exam:  Constitutional: Alert, no distress, well-groomed.  Skin: No rashes in visible areas.  Eye: Round. Conjunctiva clear, lids normal. No icterus.   ENMT: Lips pink without lesions, good dentition, moist mucous membranes. Phonation normal.  Neck: No masses, no " thyromegaly. Moves freely without pain.  Respiratory: Unlabored respiratory effort, no cough or audible wheeze  Psych: Alert and oriented x3, normal affect and mood.       Assessment and Plan:   The following treatment plan was discussed:     1. Elevated alkaline phosphatase level  Patient is already had a CT scan of the abdomen as well as an ultrasound  She will need an ERCP and gastroenterology    2. Uncontrolled type 2 diabetes mellitus with hyperglycemia (HCC)  Begin Metformin  Begin healthy lifestyle to include decreasing calories and increasing activity  Repeat labs in 2 months.  - metFORMIN ER (GLUCOPHAGE XR) 500 MG TABLET SR 24 HR; Take 1 tablet by mouth every day.  Dispense: 90 tablet; Refill: 0  - Comp Metabolic Panel; Future  - HEMOGLOBIN A1C; Future  Other orders  - amLODIPine (NORVASC) 5 MG Tab        Follow-up: Return in about 2 months (around 6/8/2021) for Reevaluation, labs.

## 2021-05-03 ENCOUNTER — APPOINTMENT (OUTPATIENT)
Dept: RADIOLOGY | Facility: MEDICAL CENTER | Age: 61
End: 2021-05-03
Attending: EMERGENCY MEDICINE
Payer: COMMERCIAL

## 2021-05-03 ENCOUNTER — HOSPITAL ENCOUNTER (EMERGENCY)
Facility: MEDICAL CENTER | Age: 61
End: 2021-05-03
Attending: EMERGENCY MEDICINE
Payer: COMMERCIAL

## 2021-05-03 VITALS
SYSTOLIC BLOOD PRESSURE: 185 MMHG | WEIGHT: 176.15 LBS | DIASTOLIC BLOOD PRESSURE: 68 MMHG | HEIGHT: 64 IN | OXYGEN SATURATION: 91 % | RESPIRATION RATE: 18 BRPM | HEART RATE: 69 BPM | BODY MASS INDEX: 30.07 KG/M2 | TEMPERATURE: 98.3 F

## 2021-05-03 DIAGNOSIS — K57.92 ACUTE DIVERTICULITIS: ICD-10-CM

## 2021-05-03 DIAGNOSIS — R10.32 LEFT LOWER QUADRANT ABDOMINAL PAIN: ICD-10-CM

## 2021-05-03 LAB
ALBUMIN SERPL BCP-MCNC: 4.4 G/DL (ref 3.2–4.9)
ALBUMIN/GLOB SERPL: 1.1 G/DL
ALP SERPL-CCNC: 122 U/L (ref 30–99)
ALT SERPL-CCNC: 29 U/L (ref 2–50)
ANION GAP SERPL CALC-SCNC: 12 MMOL/L (ref 7–16)
APPEARANCE UR: CLEAR
AST SERPL-CCNC: 33 U/L (ref 12–45)
BASOPHILS # BLD AUTO: 0.8 % (ref 0–1.8)
BASOPHILS # BLD: 0.07 K/UL (ref 0–0.12)
BILIRUB SERPL-MCNC: 0.6 MG/DL (ref 0.1–1.5)
BILIRUB UR QL STRIP.AUTO: NEGATIVE
BUN SERPL-MCNC: 11 MG/DL (ref 8–22)
CALCIUM SERPL-MCNC: 9.8 MG/DL (ref 8.4–10.2)
CHLORIDE SERPL-SCNC: 103 MMOL/L (ref 96–112)
CO2 SERPL-SCNC: 26 MMOL/L (ref 20–33)
COLOR UR: YELLOW
CREAT SERPL-MCNC: 0.83 MG/DL (ref 0.5–1.4)
EKG IMPRESSION: NORMAL
EOSINOPHIL # BLD AUTO: 0.36 K/UL (ref 0–0.51)
EOSINOPHIL NFR BLD: 4.3 % (ref 0–6.9)
ERYTHROCYTE [DISTWIDTH] IN BLOOD BY AUTOMATED COUNT: 39.2 FL (ref 35.9–50)
GLOBULIN SER CALC-MCNC: 3.9 G/DL (ref 1.9–3.5)
GLUCOSE SERPL-MCNC: 132 MG/DL (ref 65–99)
GLUCOSE UR STRIP.AUTO-MCNC: NEGATIVE MG/DL
HCT VFR BLD AUTO: 46.9 % (ref 37–47)
HGB BLD-MCNC: 16 G/DL (ref 12–16)
IMM GRANULOCYTES # BLD AUTO: 0.02 K/UL (ref 0–0.11)
IMM GRANULOCYTES NFR BLD AUTO: 0.2 % (ref 0–0.9)
KETONES UR STRIP.AUTO-MCNC: NEGATIVE MG/DL
LEUKOCYTE ESTERASE UR QL STRIP.AUTO: NEGATIVE
LYMPHOCYTES # BLD AUTO: 1.37 K/UL (ref 1–4.8)
LYMPHOCYTES NFR BLD: 16.5 % (ref 22–41)
MCH RBC QN AUTO: 31.3 PG (ref 27–33)
MCHC RBC AUTO-ENTMCNC: 34.1 G/DL (ref 33.6–35)
MCV RBC AUTO: 91.8 FL (ref 81.4–97.8)
MICRO URNS: NORMAL
MONOCYTES # BLD AUTO: 0.47 K/UL (ref 0–0.85)
MONOCYTES NFR BLD AUTO: 5.7 % (ref 0–13.4)
NEUTROPHILS # BLD AUTO: 5.99 K/UL (ref 2–7.15)
NEUTROPHILS NFR BLD: 72.5 % (ref 44–72)
NITRITE UR QL STRIP.AUTO: NEGATIVE
NRBC # BLD AUTO: 0 K/UL
NRBC BLD-RTO: 0 /100 WBC
PH UR STRIP.AUTO: 7 [PH] (ref 5–8)
PLATELET # BLD AUTO: 139 K/UL (ref 164–446)
PMV BLD AUTO: 12.7 FL (ref 9–12.9)
POTASSIUM SERPL-SCNC: 3.7 MMOL/L (ref 3.6–5.5)
PROT SERPL-MCNC: 8.3 G/DL (ref 6–8.2)
PROT UR QL STRIP: NEGATIVE MG/DL
RBC # BLD AUTO: 5.11 M/UL (ref 4.2–5.4)
RBC UR QL AUTO: NEGATIVE
SODIUM SERPL-SCNC: 141 MMOL/L (ref 135–145)
SP GR UR STRIP.AUTO: <=1.005
TROPONIN T SERPL-MCNC: 8 NG/L (ref 6–19)
WBC # BLD AUTO: 8.3 K/UL (ref 4.8–10.8)

## 2021-05-03 PROCEDURE — 84484 ASSAY OF TROPONIN QUANT: CPT

## 2021-05-03 PROCEDURE — 36415 COLL VENOUS BLD VENIPUNCTURE: CPT

## 2021-05-03 PROCEDURE — 74176 CT ABD & PELVIS W/O CONTRAST: CPT

## 2021-05-03 PROCEDURE — 93005 ELECTROCARDIOGRAM TRACING: CPT | Performed by: EMERGENCY MEDICINE

## 2021-05-03 PROCEDURE — 80053 COMPREHEN METABOLIC PANEL: CPT

## 2021-05-03 PROCEDURE — 99284 EMERGENCY DEPT VISIT MOD MDM: CPT

## 2021-05-03 PROCEDURE — 85025 COMPLETE CBC W/AUTO DIFF WBC: CPT

## 2021-05-03 PROCEDURE — 81003 URINALYSIS AUTO W/O SCOPE: CPT

## 2021-05-03 RX ORDER — METRONIDAZOLE 500 MG/1
500 TABLET ORAL 3 TIMES DAILY
Qty: 30 TABLET | Refills: 0 | Status: SHIPPED | OUTPATIENT
Start: 2021-05-03

## 2021-05-03 RX ORDER — CEPHALEXIN 500 MG/1
500 CAPSULE ORAL 4 TIMES DAILY
Qty: 40 CAPSULE | Refills: 0 | Status: SHIPPED | OUTPATIENT
Start: 2021-05-03 | End: 2022-09-14

## 2021-05-03 ASSESSMENT — PAIN DESCRIPTION - PAIN TYPE: TYPE: ACUTE PAIN

## 2021-05-03 NOTE — ED PROVIDER NOTES
ED Provider Note    ED Provider    Means of arrival: Private vehicle  History obtained from: Patient  History limited by: None    CHIEF COMPLAINT  Chief Complaint   Patient presents with    Abdominal Pain     LLQ pain, onset 5/2/21 @ 0700       HPI  Chaparrita Maciel is a 60 y.o. female who presents complaints of left lower quadrant pain, started yesterday has been intermittent, currently she has no pain.  She has a history of generalized abdominal pain with diverticulosis and diverticulitis.  This is different and that is the left lower quadrant pain that radiates down to the left.  It is intermittent, pain is sometimes moderate currently is absent.    Said no nausea no vomiting no diarrhea.  No hematuria    No fevers chills or sweats    REVIEW OF SYSTEMS  See HPI for further details. All other systems are negative.     PAST MEDICAL HISTORY   has a past medical history of Hypertension, Migraine, Mitral valve prolapse, Palpitations, Sarcoid, and Sarcoidosis, lung (HCC).    SOCIAL HISTORY  Social History     Tobacco Use    Smoking status: Never Smoker    Smokeless tobacco: Never Used   Substance and Sexual Activity    Alcohol use: No    Drug use: No    Sexual activity: Not Currently       SURGICAL HISTORY  patient denies any surgical history    CURRENT MEDICATIONS  Home Medications       Reviewed by Radha Arredondo R.N. (Registered Nurse) on 05/03/21 at 0556  Med List Status: Not Addressed     Medication Last Dose Status   amLODIPine (NORVASC) 5 MG Tab 5/1/2021 Active   aspirin (ASA) 81 MG Chew Tab chewable tablet  Active   butalbital-acetaminophen-caffeine-codeine (FIORICET W/CODEINE) -87-30 MG per capsule  Active   metFORMIN ER (GLUCOPHAGE XR) 500 MG TABLET SR 24 HR  Active   multivitamin (THERAGRAN) Tab  Active   VITAMIN D PO  Active                    ALLERGIES  Allergies   Allergen Reactions    Augmentin Nausea    Epinephrine     Iodine     Losartan Nausea    Morphine Anaphylaxis and Vomiting    Percocet  "[Perloxx]     Shellfish Allergy      Avacados    Sulfa Drugs      Sick    Tetracycline      Sick      Vicodin [Hydrocodone-Acetaminophen]        PHYSICAL EXAM  VITAL SIGNS: BP (!) 185/68   Pulse 69   Temp 36.8 °C (98.3 °F) (Temporal)   Resp 18   Ht 1.626 m (5' 4\")   Wt 79.9 kg (176 lb 2.4 oz)   LMP 03/09/2011   SpO2 91%   BMI 30.24 kg/m²   Constitutional: Alert in no apparent distress.  HENT: No signs of trauma, Mucous membranes are moist   Eyes:  Conjunctiva normal, Non-icteric.   Neck: Normal range of motion, No tenderness, Supple,  Lymphatic: No lymphadenopathy noted.   Cardiovascular: Regular rate and rhythm, no murmurs.   Thorax & Lungs: Normal breath sounds, No respiratory distress, No wheezing, No chest tenderness.   Abdomen: Bowel sounds normal, Soft, No tenderness, No masses, No pulsatile masses. No peritoneal signs.  Skin: Warm, Dry,Normal color  Back: No bony tenderness, No CVA tenderness.   Extremities:No edema, No tenderness, No cyanosis,    Musculoskeletal: Good range of motion in all major joints. No tenderness to palpation or major deformities noted.   Neurologic: Alert ,Oriented x4, Normal motor function, Normal sensory function, No focal deficits noted.   Psychiatric: Affect normal, Judgment normal, Mood normal.       MEDICAL DECISION MAKING  This is a 60 y.o. female who presents with a left lower quadrant pain.  She currently has no pain, pain medications are not needed at this time.  She has a history of diverticulitis that is considered.  She is also having radiation on into the pelvis so kidney stone kidney infection also considered.  Lab test and CT will be done.    DIAGNOSTIC STUDIES / PROCEDURES    EKG      LABS  Results for orders placed or performed during the hospital encounter of 05/03/21   CBC WITH DIFFERENTIAL   Result Value Ref Range    WBC 8.3 4.8 - 10.8 K/uL    RBC 5.11 4.20 - 5.40 M/uL    Hemoglobin 16.0 12.0 - 16.0 g/dL    Hematocrit 46.9 37.0 - 47.0 %    MCV 91.8 81.4 - " 97.8 fL    MCH 31.3 27.0 - 33.0 pg    MCHC 34.1 33.6 - 35.0 g/dL    RDW 39.2 35.9 - 50.0 fL    Platelet Count 139 (L) 164 - 446 K/uL    MPV 12.7 9.0 - 12.9 fL    Neutrophils-Polys 72.50 (H) 44.00 - 72.00 %    Lymphocytes 16.50 (L) 22.00 - 41.00 %    Monocytes 5.70 0.00 - 13.40 %    Eosinophils 4.30 0.00 - 6.90 %    Basophils 0.80 0.00 - 1.80 %    Immature Granulocytes 0.20 0.00 - 0.90 %    Nucleated RBC 0.00 /100 WBC    Neutrophils (Absolute) 5.99 2.00 - 7.15 K/uL    Lymphs (Absolute) 1.37 1.00 - 4.80 K/uL    Monos (Absolute) 0.47 0.00 - 0.85 K/uL    Eos (Absolute) 0.36 0.00 - 0.51 K/uL    Baso (Absolute) 0.07 0.00 - 0.12 K/uL    Immature Granulocytes (abs) 0.02 0.00 - 0.11 K/uL    NRBC (Absolute) 0.00 K/uL   COMP METABOLIC PANEL   Result Value Ref Range    Sodium 141 135 - 145 mmol/L    Potassium 3.7 3.6 - 5.5 mmol/L    Chloride 103 96 - 112 mmol/L    Co2 26 20 - 33 mmol/L    Anion Gap 12.0 7.0 - 16.0    Glucose 132 (H) 65 - 99 mg/dL    Bun 11 8 - 22 mg/dL    Creatinine 0.83 0.50 - 1.40 mg/dL    Calcium 9.8 8.4 - 10.2 mg/dL    AST(SGOT) 33 12 - 45 U/L    ALT(SGPT) 29 2 - 50 U/L    Alkaline Phosphatase 122 (H) 30 - 99 U/L    Total Bilirubin 0.6 0.1 - 1.5 mg/dL    Albumin 4.4 3.2 - 4.9 g/dL    Total Protein 8.3 (H) 6.0 - 8.2 g/dL    Globulin 3.9 (H) 1.9 - 3.5 g/dL    A-G Ratio 1.1 g/dL   TROPONIN   Result Value Ref Range    Troponin T 8 6 - 19 ng/L   URINALYSIS CULTURE, IF INDICATED    Specimen: Blood   Result Value Ref Range    Color Yellow     Character Clear     Specific Gravity <=1.005 <1.035    Ph 7.0 5.0 - 8.0    Glucose Negative Negative mg/dL    Ketones Negative Negative mg/dL    Protein Negative Negative mg/dL    Bilirubin Negative Negative    Nitrite Negative Negative    Leukocyte Esterase Negative Negative    Occult Blood Negative Negative    Micro Urine Req see below    ESTIMATED GFR   Result Value Ref Range    GFR If African American >60 >60 mL/min/1.73 m 2    GFR If Non  >60 >60  mL/min/1.73 m 2   EKG (NOW)   Result Value Ref Range    Report       [facility] Emergency Dept.    Test Date:  2021  Pt Name:    WILLIAM COLE               [facility]  MRN:        [id number]                      [facility]  Gender:     Female                       Technician: [id number]  :        1960                   Requested By:PIO VIVAR  Order #:    [id number]                    Reading MD: PIO VIVAR D.O.    Measurements  Intervals                                Axis  Rate:       76                           P:          -7  MT:         141                          QRS:        -19  QRSD:       90                           T:          33  QT:         425  QTc:        478    Interpretive Statements  Sinus rhythm  Left ventricular hypertrophy  Compared to ECG 2021 15:03:33  No significant changes  Electronically Signed On 5-3-2021 7:04:21 PDT by PIO VIVAR D.O.           RADIOLOGY  CT-ABDOMEN-PELVIS W/O   Final Result         1. Acute uncomplicated sigmoid diverticulitis. No extraluminal gas. No fluid collection.          COURSE  Pertinent Labs & Imaging studies reviewed. (See chart for details)    6:14 AM - Patient seen and examined at bedside. Discussed plan of care, I    Patient left lower quadrant abdominal pain.  She has history of diverticulitis and CT is showing acute diverticulitis.  She was medicated with IV antibiotics here which she declined, but she was discharged home with antibiotics.  She is treated symptomatically with Motrin Tylenol for discomfort and return if her symptoms change or worsen    Discharged home in stable condition    FINAL IMPRESSION  1. Left lower quadrant abdominal pain    2. Acute diverticulitis        The note accurately reflects work and decisions made by me.  Pio Vivar D.O.  5/3/2021  1:27 PM

## 2021-05-03 NOTE — ED TRIAGE NOTES
"Pt c/o LLQ abd pain, onset 5/2/21 @0700, \"pinching\" pain. Currently 2/10 pain but this fluctuates in intensity. Denies fevers, N/V/D.  "

## 2021-05-03 NOTE — DISCHARGE INSTRUCTIONS
Your CT scan shows diverticulitis.  You are being placed on antibiotics.  Use Motrin Tylenol for discomfort.  Return if your symptoms change or worsen

## 2022-07-19 DIAGNOSIS — E55.9 VITAMIN D DEFICIENCY: ICD-10-CM

## 2022-07-19 DIAGNOSIS — E11.65 UNCONTROLLED TYPE 2 DIABETES MELLITUS WITH HYPERGLYCEMIA (HCC): ICD-10-CM

## 2022-07-19 DIAGNOSIS — E78.00 PURE HYPERCHOLESTEROLEMIA: ICD-10-CM

## 2022-08-23 LAB
25(OH)D3+25(OH)D2 SERPL-MCNC: 40.4 NG/ML (ref 30–100)
ALBUMIN SERPL-MCNC: 4.6 G/DL (ref 3.8–4.8)
ALBUMIN/CREAT UR: 41 MG/G CREAT (ref 0–29)
ALBUMIN/GLOB SERPL: 1.5 {RATIO} (ref 1.2–2.2)
ALP SERPL-CCNC: 168 IU/L (ref 44–121)
ALT SERPL-CCNC: 44 IU/L (ref 0–32)
AST SERPL-CCNC: 45 IU/L (ref 0–40)
BASOPHILS # BLD AUTO: 0.1 X10E3/UL (ref 0–0.2)
BASOPHILS NFR BLD AUTO: 1 %
BILIRUB SERPL-MCNC: 0.6 MG/DL (ref 0–1.2)
BUN SERPL-MCNC: 9 MG/DL (ref 8–27)
BUN/CREAT SERPL: 10 (ref 12–28)
CALCIUM SERPL-MCNC: 9.5 MG/DL (ref 8.7–10.3)
CHLORIDE SERPL-SCNC: 102 MMOL/L (ref 96–106)
CHOLEST SERPL-MCNC: 195 MG/DL (ref 100–199)
CO2 SERPL-SCNC: 22 MMOL/L (ref 20–29)
CREAT SERPL-MCNC: 0.9 MG/DL (ref 0.57–1)
CREAT UR-MCNC: 106.3 MG/DL
EGFRCR-CYS SERPLBLD CKD-EPI 2021: 72 ML/MIN/1.73
EOSINOPHIL # BLD AUTO: 0.4 X10E3/UL (ref 0–0.4)
EOSINOPHIL NFR BLD AUTO: 6 %
ERYTHROCYTE [DISTWIDTH] IN BLOOD BY AUTOMATED COUNT: 11.8 % (ref 11.7–15.4)
GLOBULIN SER CALC-MCNC: 3.1 G/DL (ref 1.5–4.5)
GLUCOSE SERPL-MCNC: 120 MG/DL (ref 65–99)
HBA1C MFR BLD: 5.9 % (ref 4.8–5.6)
HCT VFR BLD AUTO: 48.5 % (ref 34–46.6)
HDLC SERPL-MCNC: 62 MG/DL
HGB BLD-MCNC: 16.3 G/DL (ref 11.1–15.9)
IMM GRANULOCYTES # BLD AUTO: 0 X10E3/UL (ref 0–0.1)
IMM GRANULOCYTES NFR BLD AUTO: 0 %
IMMATURE CELLS  115398: ABNORMAL
LABORATORY COMMENT REPORT: ABNORMAL
LDLC SERPL CALC-MCNC: 112 MG/DL (ref 0–99)
LYMPHOCYTES # BLD AUTO: 2 X10E3/UL (ref 0.7–3.1)
LYMPHOCYTES NFR BLD AUTO: 28 %
MCH RBC QN AUTO: 31.6 PG (ref 26.6–33)
MCHC RBC AUTO-ENTMCNC: 33.6 G/DL (ref 31.5–35.7)
MCV RBC AUTO: 94 FL (ref 79–97)
MICROALBUMIN UR-MCNC: 43.6 UG/ML
MONOCYTES # BLD AUTO: 0.6 X10E3/UL (ref 0.1–0.9)
MONOCYTES NFR BLD AUTO: 9 %
MORPHOLOGY BLD-IMP: ABNORMAL
NEUTROPHILS # BLD AUTO: 4.1 X10E3/UL (ref 1.4–7)
NEUTROPHILS NFR BLD AUTO: 56 %
NRBC BLD AUTO-RTO: ABNORMAL %
PLATELET # BLD AUTO: ABNORMAL X10E3/UL
POTASSIUM SERPL-SCNC: 3.8 MMOL/L (ref 3.5–5.2)
PROT SERPL-MCNC: 7.7 G/DL (ref 6–8.5)
RBC # BLD AUTO: 5.16 X10E6/UL (ref 3.77–5.28)
SODIUM SERPL-SCNC: 140 MMOL/L (ref 134–144)
T3FREE SERPL-MCNC: 3 PG/ML (ref 2–4.4)
T4 FREE SERPL-MCNC: 1.26 NG/DL (ref 0.82–1.77)
TRIGL SERPL-MCNC: 121 MG/DL (ref 0–149)
TSH SERPL DL<=0.005 MIU/L-ACNC: 1.9 UIU/ML (ref 0.45–4.5)
VLDLC SERPL CALC-MCNC: 21 MG/DL (ref 5–40)
WBC # BLD AUTO: 7.2 X10E3/UL (ref 3.4–10.8)

## 2022-09-08 ENCOUNTER — APPOINTMENT (OUTPATIENT)
Dept: MEDICAL GROUP | Age: 62
End: 2022-09-08
Payer: COMMERCIAL

## 2022-09-14 ENCOUNTER — TELEMEDICINE (OUTPATIENT)
Dept: MEDICAL GROUP | Age: 62
End: 2022-09-14
Payer: COMMERCIAL

## 2022-09-14 VITALS — HEIGHT: 64 IN | WEIGHT: 177 LBS | BODY MASS INDEX: 30.22 KG/M2

## 2022-09-14 DIAGNOSIS — I10 ESSENTIAL HYPERTENSION: ICD-10-CM

## 2022-09-14 DIAGNOSIS — R74.8 ELEVATED ALKALINE PHOSPHATASE LEVEL: ICD-10-CM

## 2022-09-14 DIAGNOSIS — E11.9 TYPE 2 DIABETES MELLITUS WITHOUT COMPLICATION, WITHOUT LONG-TERM CURRENT USE OF INSULIN (HCC): ICD-10-CM

## 2022-09-14 DIAGNOSIS — M89.8X9 BONE PAIN: ICD-10-CM

## 2022-09-14 PROBLEM — R07.2 PRECORDIAL CHEST PAIN: Status: ACTIVE | Noted: 2021-01-14

## 2022-09-14 PROCEDURE — 99214 OFFICE O/P EST MOD 30 MIN: CPT | Mod: 95 | Performed by: FAMILY MEDICINE

## 2022-09-14 ASSESSMENT — PATIENT HEALTH QUESTIONNAIRE - PHQ9: CLINICAL INTERPRETATION OF PHQ2 SCORE: 0

## 2022-09-14 NOTE — PROGRESS NOTES
This medical record contains text that has been entered with the assistance of computer voice recognition and dictation software.  Therefore, it may contain unintended errors in text, spelling, punctuation, or grammar      This evaluation was conducted via Zoom using secure and encrypted videoconferencing technology. The patient was in their home in the [facility].    The patient's identity was confirmed and verbal consent was obtained for this virtual visit.    Chief Complaint   Patient presents with    Follow-Up     Teste results          Chaparrita Maciel is a 62 y.o. female here evaluation and management of: Labs      HPI:           1. Type 2 diabetes mellitus without complication, without long-term current use of insulin (HCC)  The patient was originally taking metformin however she stopped because she wanted to do this on her own.  She has been using lifestyle modification.  And she has been very successful she lost 10 pounds and her hemoglobin A1c is now in the prediabetes range.  She denies any polydipsia no polyuria no polyphagia no numbness or tingling.         Latest Reference Range & Units 3/24/21 06:54 8/17/22 09:25   Glycohemoglobin 4.8 - 5.6 % 7.3 (H) 5.9 (H)   (H): Data is abnormally high  2. Essential hypertension  Amlodipine 5 mg p.o. daily      Overall the patient has been compliant with his medical regimen, denies any adverse side effects such as cough, no syncope, no presyncope, no excessive fatigue.  The patient denies any chest pain today no headaches.      3. Elevated alkaline phosphatase level  4. Bone pain  Patient states she does have a history of elevated alkaline phosphatase he states he also has bone pain, specifically bilateral shoulders bilateral knees wrists.  She is wondering if it is the bone that is causing the pain.     1 yr ago   (3/24/21) 1 yr ago   (2/8/21) 1 yr ago   (12/18/20) 1 yr ago   (12/14/20) 2 yr ago   (12/27/19) 11 yr ago   (3/17/11) 13 yr ago   (8/15/09)   "  Alkaline Phosphatase 39 - 117 IU/L 142 High   193 High  R  173 High   188 High   168 High  R  136 High  R  113 High  R    Liver Fractions 18 - 85 % 40          Bone Fractions 14 - 68 % 58          Intestinal Fractions 0 - 18 % 2          Resulting Agency  [facility]   [facility] V [facility] V M M       Current medicines (including changes today)  Current Outpatient Medications   Medication Sig Dispense Refill    metroNIDAZOLE (FLAGYL) 500 MG Tab Take 1 tablet by mouth 3 times a day. 30 tablet 0    amLODIPine (NORVASC) 5 MG Tab One tab every other evening      butalbital-acetaminophen-caffeine-codeine (FIORICET W/CODEINE) -17-30 MG per capsule       multivitamin (THERAGRAN) Tab Take 1 Tab by mouth every day.      VITAMIN D PO Take 1,000 mg by mouth 4 times a day.       No current facility-administered medications for this visit.     She  has a past medical history of Hypertension, Migraine, Mitral valve prolapse, Palpitations, Sarcoid, and Sarcoidosis, lung (HCC).  She  has no past surgical history on file.  Social History     Tobacco Use    Smoking status: Never    Smokeless tobacco: Never   Vaping Use    Vaping Use: Never used   Substance Use Topics    Alcohol use: No    Drug use: No     Social History     Social History Narrative    Not on file     Family History   Problem Relation Age of Onset    Hypertension Father     Hypertension Brother     Diabetes Brother     Hyperlipidemia Neg Hx      Family Status   Relation Name Status    Fa  (Not Specified)    Bro  (Not Specified)    Neg Hx  (Not Specified)         ROS    The pertinent  ROS findings can be seen in the HPI above.     All other systems reviewed and are negative     Objective:     Ht 1.626 m (5' 4\")   Wt 80.3 kg (177 lb)  Body mass index is 30.38 kg/m².      Physical Exam:    Constitutional: Alert, no distress.  Skin: No suspicious lesions  Eye: Equal, round and reactive, conjunctiva clear, lids normal.  ENMT: Lips without lesions, good dentition, oropharynx " clear.  Neck: Trachea midline, no masses, no thyromegaly. No cervical or supraclavicular lymphadenopathy.  Respiratory: Unlabored respiratory effort, lungs clear to auscultation, no wheezes, no ronchi.  Cardiovascular: Normal S1, S2, no murmur, no edema  Abdomen: Soft, non-tender, no masses, no hepatosplenomegaly.        Assessment and Plan:   The following treatment plan was discussed      1. Type 2 diabetes mellitus without complication, without long-term current use of insulin (HCC)  She has reached her goals with lifestyle modification  I congratulated her and encouraged her to continue      2. Essential hypertension  Patient has been stable with current management  We will make no changes for now      3. Elevated alkaline phosphatase level  We will proceed with a bone density    - DS-BONE DENSITY STUDY (DEXA); Future    4. Bone pain  Proceed with a DEXA scan  - DS-BONE DENSITY STUDY (DEXA); Future            Instructed to Follow up in clinic or ER for worsening symptoms, difficulty breathing, lack of expected recovery, or should new symptoms or problems arise.    Followup: Return in about 3 months (around 12/14/2022) for Reevaluation, labs.